# Patient Record
Sex: FEMALE | Race: WHITE | Employment: FULL TIME | URBAN - METROPOLITAN AREA
[De-identification: names, ages, dates, MRNs, and addresses within clinical notes are randomized per-mention and may not be internally consistent; named-entity substitution may affect disease eponyms.]

---

## 2017-03-10 ENCOUNTER — EMPLOYEE WELLNESS (OUTPATIENT)
Dept: OTHER | Age: 57
End: 2017-03-10

## 2017-04-10 ENCOUNTER — HOSPITAL ENCOUNTER (OUTPATIENT)
Dept: WOMENS IMAGING | Age: 57
Discharge: HOME | End: 2017-04-10

## 2017-04-10 DIAGNOSIS — Z12.31 VISIT FOR SCREENING MAMMOGRAM: ICD-10-CM

## 2017-08-07 ENCOUNTER — OFFICE VISIT (OUTPATIENT)
Dept: DERMATOLOGY | Age: 57
End: 2017-08-07

## 2017-08-07 DIAGNOSIS — Z80.8 FAMILY HISTORY OF MELANOMA: ICD-10-CM

## 2017-08-07 DIAGNOSIS — D22.9 MULTIPLE NEVI: Primary | ICD-10-CM

## 2017-08-07 DIAGNOSIS — L82.1 SEBORRHEIC KERATOSIS: ICD-10-CM

## 2017-08-07 DIAGNOSIS — L57.0 AK (ACTINIC KERATOSIS): ICD-10-CM

## 2017-08-07 PROCEDURE — 17000 DESTRUCT PREMALG LESION: CPT | Performed by: DERMATOLOGY

## 2017-08-07 PROCEDURE — 99213 OFFICE O/P EST LOW 20 MIN: CPT | Performed by: DERMATOLOGY

## 2017-08-07 PROCEDURE — 17003 DESTRUCT PREMALG LES 2-14: CPT | Performed by: DERMATOLOGY

## 2018-03-15 ENCOUNTER — EMPLOYEE WELLNESS (OUTPATIENT)
Dept: OTHER | Age: 58
End: 2018-03-15

## 2018-03-15 LAB
CHOLESTEROL, TOTAL: 207 MG/DL (ref 0–199)
GLUCOSE BLD-MCNC: 73 MG/DL (ref 70–99)
HDLC SERPL-MCNC: 76 MG/DL (ref 40–60)
LDL CHOLESTEROL CALCULATED: 108 MG/DL
TRIGL SERPL-MCNC: 116 MG/DL (ref 0–150)

## 2018-03-20 VITALS — WEIGHT: 177 LBS | BODY MASS INDEX: 28.57 KG/M2

## 2018-04-02 VITALS — BODY MASS INDEX: 29.21 KG/M2 | WEIGHT: 181 LBS

## 2018-04-11 ENCOUNTER — HOSPITAL ENCOUNTER (OUTPATIENT)
Dept: WOMENS IMAGING | Age: 58
Discharge: HOME | End: 2018-04-11
Attending: OBSTETRICS & GYNECOLOGY | Admitting: OBSTETRICS & GYNECOLOGY

## 2018-04-11 DIAGNOSIS — Z12.31 VISIT FOR SCREENING MAMMOGRAM: ICD-10-CM

## 2018-04-30 ENCOUNTER — NURSE TRIAGE (OUTPATIENT)
Dept: OTHER | Facility: CLINIC | Age: 58
End: 2018-04-30

## 2018-05-10 RX ORDER — EPINEPHRINE 0.3 MG/.3ML
INJECTION SUBCUTANEOUS
Qty: 2 EACH | Refills: 0 | Status: SHIPPED | OUTPATIENT
Start: 2018-05-10 | End: 2021-04-26 | Stop reason: SDUPTHER

## 2018-08-07 ENCOUNTER — OFFICE VISIT (OUTPATIENT)
Dept: FAMILY MEDICINE CLINIC | Age: 58
End: 2018-08-07

## 2018-08-07 VITALS
SYSTOLIC BLOOD PRESSURE: 130 MMHG | WEIGHT: 183 LBS | BODY MASS INDEX: 29.54 KG/M2 | DIASTOLIC BLOOD PRESSURE: 64 MMHG | HEART RATE: 65 BPM | TEMPERATURE: 97.8 F | OXYGEN SATURATION: 96 % | RESPIRATION RATE: 14 BRPM

## 2018-08-07 DIAGNOSIS — R63.5 WEIGHT GAIN: ICD-10-CM

## 2018-08-07 DIAGNOSIS — Z12.11 SCREEN FOR COLON CANCER: ICD-10-CM

## 2018-08-07 DIAGNOSIS — R00.2 HEART PALPITATIONS: Primary | ICD-10-CM

## 2018-08-07 DIAGNOSIS — R43.0 ANOSMIA: ICD-10-CM

## 2018-08-07 PROCEDURE — 99203 OFFICE O/P NEW LOW 30 MIN: CPT | Performed by: FAMILY MEDICINE

## 2018-08-07 PROCEDURE — 93000 ELECTROCARDIOGRAM COMPLETE: CPT | Performed by: FAMILY MEDICINE

## 2018-08-07 RX ORDER — MEDROXYPROGESTERONE ACETATE 2.5 MG/1
2.5 TABLET ORAL NIGHTLY
COMMUNITY
Start: 2017-12-19

## 2018-08-07 RX ORDER — ESTRADIOL 1 MG/1
1 TABLET ORAL NIGHTLY
COMMUNITY
Start: 2017-12-19

## 2018-08-07 ASSESSMENT — PATIENT HEALTH QUESTIONNAIRE - PHQ9
1. LITTLE INTEREST OR PLEASURE IN DOING THINGS: 0
SUM OF ALL RESPONSES TO PHQ QUESTIONS 1-9: 0
2. FEELING DOWN, DEPRESSED OR HOPELESS: 0
SUM OF ALL RESPONSES TO PHQ9 QUESTIONS 1 & 2: 0

## 2018-08-07 NOTE — PROGRESS NOTES
Subjective:      Patient ID: Hannah Jean is a 59 y.o. female. Blood pressure 130/64, pulse 65, temperature 97.8 °F (36.6 °C), temperature source Oral, resp. rate 14, weight 183 lb (83 kg), SpO2 96 %, not currently breastfeeding. HPI last seen over 3 yrs ago. Usually goes to gyn for well care. Has several concerns to discuss. Has noted loss of sense of smell over past few years. For instance, she was peeling eggs for deviled eggs and she did not note at all. Has hx of PCV's but has noted that her palpitations are more frequent, but still without pain or dyspnea. Palpitations are brief. Controls with deep breathing. Notes them more at night. Gradual weight gain. Prior gastric sleeve done 2010- does not still follow with DR Navarro Lang. Uses daily MVI.  has gained 30 lbs back from prior weight after surgery. Has not had colon cancer screening- would like to have referral.    She is taking estrace and provera for menopausal symptoms- urine incontinence, vaginal dryness.       Lab Results   Component Value Date    CHOL 207 (H) 03/15/2018    TRIG 116 03/15/2018    HDL 76 (H) 03/15/2018    LDLCALC 108 (H) 03/15/2018     Lab Results   Component Value Date    ALT 15 01/20/2015    AST 19 01/20/2015        The 10-year ASCVD risk score (Fabby Villa, et al., 2013) is: 2.8%    Values used to calculate the score:      Age: 59 years      Sex: Female      Is Non- : No      Diabetic: No      Tobacco smoker: No      Systolic Blood Pressure: 595 mmHg      Is BP treated: No      HDL Cholesterol: 76 mg/dL      Total Cholesterol: 207 mg/dL     Has normal renal function   Lab Results   Component Value Date     01/20/2015    K 3.7 01/20/2015    BUN 16 01/20/2015    CREATININE 0.5 01/20/2015          Patient Active Problem List   Diagnosis    Hyperlipidemia    Obesity    Vitamin D deficiency    Bee sting allergy    Heart palpitations- pvc's on ekg    Primary osteoarthritis of right knee    Pes anserinus bursitis      Body mass index is 29.54 kg/m². Wt Readings from Last 3 Encounters:   08/07/18 183 lb (83 kg)   03/15/18 181 lb (82.1 kg)   03/10/17 177 lb (80.3 kg)      BP Readings from Last 3 Encounters:   08/07/18 130/64   05/12/16 123/84   01/09/15 118/76      Current Outpatient Prescriptions   Medication Sig Dispense Refill    estradiol (ESTRACE) 1 MG tablet take 1 tablet (1 mg) by oral route once daily for 90 days      medroxyPROGESTERone (PROVERA) 2.5 MG tablet take 1 tablet by oral route daily for 90 days      EPINEPHrine (EPIPEN 2-GODWIN) 0.3 MG/0.3ML SOAJ injection INJECT 0.3 INTO THE SKIN ONCE FOR UP TO ONE DOSE AS DIRECTED FOR ALLERGIC REACTION 2 each 0    Multiple Vitamin (MULTIVITAMIN PO) Take  by mouth.  aspirin 81 MG EC tablet Take 81 mg by mouth daily. No current facility-administered medications for this visit. Immunization History   Administered Date(s) Administered    Hepatitis A 07/28/2015    MMR 07/28/2015    Pneumococcal Polysaccharide (Ozoontefo91) 07/28/2015    Tdap (Boostrix, Adacel) 07/28/2015    Typhoid Inactivated 07/25/2015        Social History   Substance Use Topics    Smoking status: Never Smoker    Smokeless tobacco: Never Used    Alcohol use Yes      Comment: Social        Review of Systems   All other systems reviewed and are negative. Objective:   Physical Exam   Constitutional: She is oriented to person, place, and time. She appears well-developed and well-nourished. No distress. HENT:   Head: Normocephalic and atraumatic. Right Ear: External ear normal.   Left Ear: External ear normal.   Nose: Nose normal.   Mouth/Throat: Oropharynx is clear and moist. No oropharyngeal exudate. TM's: nl  Canals: nl  Hearing: nl   Eyes: Conjunctivae and EOM are normal. Pupils are equal, round, and reactive to light. Right eye exhibits no discharge. Left eye exhibits no discharge. Neck: Normal range of motion. Neck supple.  No

## 2018-08-10 DIAGNOSIS — R00.2 HEART PALPITATIONS: ICD-10-CM

## 2018-08-10 LAB
A/G RATIO: 1.7 (ref 1.1–2.2)
ALBUMIN SERPL-MCNC: 4.6 G/DL (ref 3.4–5)
ALP BLD-CCNC: 117 U/L (ref 40–129)
ALT SERPL-CCNC: 15 U/L (ref 10–40)
ANION GAP SERPL CALCULATED.3IONS-SCNC: 15 MMOL/L (ref 3–16)
AST SERPL-CCNC: 20 U/L (ref 15–37)
BASOPHILS ABSOLUTE: 0 K/UL (ref 0–0.2)
BASOPHILS RELATIVE PERCENT: 0.6 %
BILIRUB SERPL-MCNC: 0.5 MG/DL (ref 0–1)
BUN BLDV-MCNC: 11 MG/DL (ref 7–20)
CALCIUM SERPL-MCNC: 9.4 MG/DL (ref 8.3–10.6)
CHLORIDE BLD-SCNC: 101 MMOL/L (ref 99–110)
CO2: 25 MMOL/L (ref 21–32)
CREAT SERPL-MCNC: 0.6 MG/DL (ref 0.6–1.2)
EOSINOPHILS ABSOLUTE: 0.1 K/UL (ref 0–0.6)
EOSINOPHILS RELATIVE PERCENT: 2.7 %
GFR AFRICAN AMERICAN: >60
GFR NON-AFRICAN AMERICAN: >60
GLOBULIN: 2.7 G/DL
GLUCOSE BLD-MCNC: 84 MG/DL (ref 70–99)
HCT VFR BLD CALC: 41.1 % (ref 36–48)
HEMOGLOBIN: 13.7 G/DL (ref 12–16)
LYMPHOCYTES ABSOLUTE: 1.4 K/UL (ref 1–5.1)
LYMPHOCYTES RELATIVE PERCENT: 26.2 %
MCH RBC QN AUTO: 31.8 PG (ref 26–34)
MCHC RBC AUTO-ENTMCNC: 33.3 G/DL (ref 31–36)
MCV RBC AUTO: 95.4 FL (ref 80–100)
MONOCYTES ABSOLUTE: 0.3 K/UL (ref 0–1.3)
MONOCYTES RELATIVE PERCENT: 5.6 %
NEUTROPHILS ABSOLUTE: 3.5 K/UL (ref 1.7–7.7)
NEUTROPHILS RELATIVE PERCENT: 64.9 %
PDW BLD-RTO: 13.1 % (ref 12.4–15.4)
PLATELET # BLD: 272 K/UL (ref 135–450)
PMV BLD AUTO: 8.6 FL (ref 5–10.5)
POTASSIUM SERPL-SCNC: 4.1 MMOL/L (ref 3.5–5.1)
RBC # BLD: 4.31 M/UL (ref 4–5.2)
SODIUM BLD-SCNC: 141 MMOL/L (ref 136–145)
TOTAL PROTEIN: 7.3 G/DL (ref 6.4–8.2)
TSH REFLEX: 2.45 UIU/ML (ref 0.27–4.2)
WBC # BLD: 5.4 K/UL (ref 4–11)

## 2018-08-13 ENCOUNTER — OFFICE VISIT (OUTPATIENT)
Dept: DERMATOLOGY | Age: 58
End: 2018-08-13

## 2018-08-13 DIAGNOSIS — D22.9 MULTIPLE NEVI: Primary | ICD-10-CM

## 2018-08-13 DIAGNOSIS — L57.0 AK (ACTINIC KERATOSIS): ICD-10-CM

## 2018-08-13 DIAGNOSIS — L82.1 SEBORRHEIC KERATOSIS: ICD-10-CM

## 2018-08-13 DIAGNOSIS — Z80.8 FAMILY HISTORY OF MALIGNANT MELANOMA: ICD-10-CM

## 2018-08-13 PROCEDURE — 17000 DESTRUCT PREMALG LESION: CPT | Performed by: DERMATOLOGY

## 2018-08-13 PROCEDURE — 99213 OFFICE O/P EST LOW 20 MIN: CPT | Performed by: DERMATOLOGY

## 2018-08-13 PROCEDURE — 17003 DESTRUCT PREMALG LES 2-14: CPT | Performed by: DERMATOLOGY

## 2018-08-13 NOTE — PROGRESS NOTES
THE SKIN ONCE FOR UP TO ONE DOSE AS DIRECTED FOR ALLERGIC REACTION 2 each 0    Multiple Vitamin (MULTIVITAMIN PO) Take  by mouth.  aspirin 81 MG EC tablet Take 81 mg by mouth daily. Allergies   Allergen Reactions    Nutritional Supplements Anaphylaxis    Penicillins Anaphylaxis    Cefuroxime Axetil          Physical Examination     Gen, well-appearing  The following were examined and determined to be normal: Psych/Neuro, Scalp/hair, Conjunctivae/eyelids, Neck, Abdomen, Back, RUE, LUE, RLE, LLE, Nails/digits and buttocks. The following were examined and determined to be abnormal: Breast/axilla/chest. Face/lip   trunk and extremities with scattered brown macules and papules   Chest (3) and R upper lip with erythematous roughly scaled macules    Assessment and Plan     1. Benign-appearing nevi, SK's and family hx of melanoma   - educ re ABCD's of MM   educ sun protection   encouraged skin check yearly (sooner if indicated), self checks    2. Few AK's on the chest (3), R upper lip (1)  - 4 lesion(s) treated with liquid nitrogen x 2 cycles. Patient educated on risk of blister, hypopigmentation/scar and wound care. F/u 1 year, sooner prn.

## 2018-08-29 ENCOUNTER — PAT TELEPHONE (OUTPATIENT)
Dept: PREADMISSION TESTING | Age: 58
End: 2018-08-29

## 2018-08-29 VITALS — WEIGHT: 180 LBS | BODY MASS INDEX: 28.93 KG/M2 | HEIGHT: 66 IN

## 2018-08-29 NOTE — PROGRESS NOTES
[address] time__0930__________        Surgery time___1030_________    Take the following medications with a sip of water:    Do not eat or drink anything after 12:00 midnight prior to your surgery. EXCEPT PREP  This includes water chewing gum, mints and ice chips. You may brush your teeth and gargle the morning of your surgery, but do not swallow the water     You may be asked to stop blood thinners such as Coumadin, Plavix, Fragmin, Lovenox, etc., or any anti-inflammatories such as:  Aspirin, Ibuprofen, Advil, Naproxen prior to your surgery. We also ask that you stop any OTC medications such as fish oil, vitamin E, glucosamine, garlic, Multivitamins, COQ 10, etc.    We ask that you do not smoke 24 hours prior to surgery  We ask that you do not  drink any alcoholic beverages 24 hours prior to surgery     You must make arrangements for a responsible adult to take you home after your surgery. For your safety you will not be allowed to leave alone or drive yourself home. Your surgery will be cancelled if you do not have a ride home. Also for your safety, it is strongly suggested that someone stay with you the first 24 hours after your surgery. A parent or legal guardian must accompany a child scheduled for surgery and plan to stay at the hospital until the child is discharged. Please do not bring other children with you. For your comfort, please wear simple loose fitting clothing to the hospital.  Please do not bring valuables. Do not wear any make-up or nail polish on your fingers or toes      For your safety, please do not wear any jewelry or body piercing's on the day of surgery. All jewelry must be removed. If you have dentures, they will be removed before going to operating room. For your convenience, we will provide you with a container.     If you wear contact lenses or glasses, they will be removed, please bring a case for

## 2018-09-06 ENCOUNTER — HOSPITAL ENCOUNTER (OUTPATIENT)
Dept: ENDOSCOPY | Age: 58
Discharge: HOME | End: 2018-09-06
Attending: INTERNAL MEDICINE | Admitting: INTERNAL MEDICINE

## 2018-09-06 VITALS
RESPIRATION RATE: 18 BRPM | HEIGHT: 66 IN | HEART RATE: 68 BPM | BODY MASS INDEX: 28.61 KG/M2 | TEMPERATURE: 97.2 F | WEIGHT: 178 LBS | OXYGEN SATURATION: 100 % | SYSTOLIC BLOOD PRESSURE: 130 MMHG | DIASTOLIC BLOOD PRESSURE: 84 MMHG

## 2018-09-06 DIAGNOSIS — Z12.11 ENCOUNTER FOR SCREENING FOR MALIGNANT NEOPLASM OF COLON: ICD-10-CM

## 2018-09-06 RX ORDER — SODIUM CHLORIDE 9 MG/ML
INJECTION, SOLUTION INTRAVENOUS CONTINUOUS
Status: DISCONTINUED | OUTPATIENT
Start: 2018-09-06 | End: 2018-09-07 | Stop reason: HOSPADM

## 2018-09-06 RX ADMIN — SODIUM CHLORIDE: 9 INJECTION, SOLUTION INTRAVENOUS at 10:17

## 2018-09-06 ASSESSMENT — PAIN SCALES - GENERAL
PAINLEVEL_OUTOF10: 0
PAINLEVEL_OUTOF10: 0

## 2018-09-06 ASSESSMENT — PAIN SCALES - WONG BAKER: WONGBAKER_NUMERICALRESPONSE: 0

## 2018-09-06 ASSESSMENT — PAIN - FUNCTIONAL ASSESSMENT: PAIN_FUNCTIONAL_ASSESSMENT: 0-10

## 2018-09-06 NOTE — ANESTHESIA POST-OP
Anesthesia Post-op Note    Patient: Blanca Keyes  MRN: [id number]  YOB: 1960  Date of evaluation: 9/6/2018  Time:  11:30 AM     Procedure(s) Performed:     Last Vitals: /83   Pulse 69   Temp 97.2 °F (36.2 °C) (Tympanic)   Resp 20   Ht 5' 6\" (1.676 m)   Wt 178 lb (80.7 kg)   LMP 11/15/2010   SpO2 97%   BMI 28.73 kg/m²     Eladio Phase I: Eladio Score: 10    Eladio Phase II: Eladio Score: 10    Anesthesia Post Evaluation    Final anesthesia type: MAC  Patient location during evaluation: PACU  Patient participation: complete - patient participated  Level of consciousness: awake and alert  Pain score: 0  Airway patency: patent  Nausea & Vomiting: no nausea and no vomiting  Complications: no  Cardiovascular status: blood pressure returned to baseline  Respiratory status: acceptable  Hydration status: euvolemic        Iban Queen MD  11:30 AM

## 2018-09-06 NOTE — ANESTHESIA PRE-OP
Department of Anesthesiology  Preprocedure Note       Name:  Deondre Parr   Age:  59 y.o.  :  1960                                          MRN:  [id number]         Date:  2018      Surgeon:    Procedure:    Medications prior to admission:   Prior to Admission medications    Medication Sig Start Date End Date Taking? Authorizing Provider   estradiol (ESTRACE) 1 MG tablet take 1 tablet (1 mg) by oral route once daily for 90 days 17   Historical Provider, MD   medroxyPROGESTERone (PROVERA) 2.5 MG tablet take 1 tablet by oral route daily for 90 days 17   Historical Provider, MD   EPINEPHrine (EPIPEN 2-GODWIN) 0.3 MG/0.3ML SOAJ injection INJECT 0.3 INTO THE SKIN ONCE FOR UP TO ONE DOSE AS DIRECTED FOR ALLERGIC REACTION 5/10/18   Mora Marley MD   Multiple Vitamin (MULTIVITAMIN PO) Take  by mouth. Historical Provider, MD   aspirin 81 MG EC tablet Take 81 mg by mouth daily. Historical Provider, MD       Current medications:    Current Outpatient Prescriptions   Medication Sig Dispense Refill    estradiol (ESTRACE) 1 MG tablet take 1 tablet (1 mg) by oral route once daily for 90 days      medroxyPROGESTERone (PROVERA) 2.5 MG tablet take 1 tablet by oral route daily for 90 days      EPINEPHrine (EPIPEN 2-GODWIN) 0.3 MG/0.3ML SOAJ injection INJECT 0.3 INTO THE SKIN ONCE FOR UP TO ONE DOSE AS DIRECTED FOR ALLERGIC REACTION 2 each 0    Multiple Vitamin (MULTIVITAMIN PO) Take  by mouth.  aspirin 81 MG EC tablet Take 81 mg by mouth daily. No current facility-administered medications for this encounter. Allergies:     Allergies   Allergen Reactions    Penicillins Anaphylaxis    Cefuroxime Axetil        Problem List:    Patient Active Problem List   Diagnosis Code    Obesity E66.9    Vitamin D deficiency E55.9    Bee sting allergy Z91.038    Heart palpitations- pvc's on ekg R00.2    Primary osteoarthritis of right knee M17.11       Past Medical History:        Diagnosis
Date    Bee sting allergy     Hyperlipidemia     Obesity        Past Surgical History:        Procedure Laterality Date    BLEPHAROPLASTY Bilateral 2014    Dr Chantel Clemente Left 2008    hammer toe repair; DR Alfredo Bosch LASBRENDA Bilateral     Dr Santiago Done GASTROPLASTY  12/7/10    Laparoscopic sleeve gastrectomy (Dr Jermaine Martin)   [address] [facility]    WRIST SURGERY Left 1992       Social History:    Social History   Substance Use Topics    Smoking status: Never Smoker    Smokeless tobacco: Never Used    Alcohol use Yes      Comment: on weekends, 2-3 drinks                                Counseling given: Not Answered      Vital Signs (Current): There were no vitals filed for this visit. BP Readings from Last 3 Encounters:   08/07/18 130/64   05/12/16 123/84   01/09/15 118/76       NPO Status:  midnight                                                                               BMI:   Wt Readings from Last 3 Encounters:   08/29/18 180 lb (81.6 kg)   08/07/18 183 lb (83 kg)   03/15/18 181 lb (82.1 kg)     There is no height or weight on file to calculate BMI. Anesthesia Evaluation   no history of anesthetic complications:   Airway: Mallampati: II  TM distance: >3 FB   Neck ROM: full  Mouth opening: > = 3 FB Dental:      Comment: Permanent bridge upper left      Pulmonary:                             ROS comment: Denies Asthma, COPD, Smoking   Cardiovascular:        (-) hypertension, past MI, CAD and  angina             ROS comment: Deneis CP, SOA with moderate exercise     Neuro/Psych:   Negative Neuro/Psych ROS              GI/Hepatic/Renal:   (+) GERD (occasional):,      (-) liver disease and no renal disease       Endo/Other:        (-) diabetes mellitus, hypothyroidism               Abdominal:           Vascular: negative vascular ROS.                                      Anesthesia Plan      MAC     ASA 2       Induction:

## 2018-09-06 NOTE — OP NOTE
Colonoscopy Procedure Note      Patient: Swapna Pierson  : 1960  Acct#:     Procedure: Colonoscopy with biopsy    Date:  2018    Surgeon:  Cosmo Dumont MD    Referring Physician:  Henrique Garcia MD    Previous Colonoscopy: NO  Date: N/A  Greater than 3 years: N/A    Preoperative Diagnosis:  1. Screening/No FH of CRC     Postoperative Diagnosis:  1. Ascending Colon Polyp 2. Mild Sigmoid Diverticulosis 3. Internal hemorrhoids    Consent:  The patient or their legal guardian has signed a consent, and is aware of the potential risks, benefits, alternatives, and potential complications of this procedure. These include, but are not limited to hemorrhage, bleeding, post procedural pain, perforation, phlebitis, aspiration, hypotension, hypoxia, cardiovascular events such as arryhthmia, and possibly death. Additionally, the possibility of missed colonic polyps and interval colon cancer was discussed in the consent. Anesthesia:  The patient was administered IV propofol per anesthesiology team.  Please see their operative records for full details. Procedure: An informed consent was obtained from the patient after explanation of indications, benefits, possible risks and complications of the procedure. The patient was then taken to the endoscopy suite, placed in the left lateral decubitus position, and the above IV anesthesia was administered. A digital rectal examination was performed and revealed negative without mass, lesions or tenderness. The Olympus video colonoscope was placed in the patient's rectum under digital direction and advanced to the cecum. The cecum was identified by characteristic anatomy and ballottment. The preparation was excellent. The ileocecal valve was identified. The terminal ileum was normal appearing. The scope was then withdrawn back through the cecum, ascending, transverse, descending, sigmoid colon, and rectum.   Careful circumferential

## 2018-09-07 PROBLEM — Z86.010 H/O COLONOSCOPY WITH POLYPECTOMY: Status: ACTIVE | Noted: 2018-09-07

## 2018-09-07 PROBLEM — Z98.890 H/O COLONOSCOPY WITH POLYPECTOMY: Status: ACTIVE | Noted: 2018-09-07

## 2018-09-07 PROBLEM — Z86.0100 H/O COLONOSCOPY WITH POLYPECTOMY: Status: ACTIVE | Noted: 2018-09-07

## 2019-05-07 ENCOUNTER — EMPLOYEE WELLNESS (OUTPATIENT)
Dept: OTHER | Age: 59
End: 2019-05-07

## 2019-05-07 LAB
CHOLESTEROL, TOTAL: 203 MG/DL (ref 0–199)
GLUCOSE BLD-MCNC: 80 MG/DL (ref 70–99)
HDLC SERPL-MCNC: 77 MG/DL (ref 40–60)
LDL CHOLESTEROL CALCULATED: 107 MG/DL
TRIGL SERPL-MCNC: 93 MG/DL (ref 0–150)

## 2019-05-08 ENCOUNTER — HOSPITAL ENCOUNTER (OUTPATIENT)
Dept: WOMENS IMAGING | Age: 59
Discharge: HOME | End: 2019-05-08
Payer: COMMERCIAL

## 2019-05-08 DIAGNOSIS — Z12.31 VISIT FOR SCREENING MAMMOGRAM: ICD-10-CM

## 2019-05-08 PROCEDURE — 77063 BREAST TOMOSYNTHESIS BI: CPT

## 2019-05-13 VITALS — BODY MASS INDEX: 27.6 KG/M2 | WEIGHT: 171 LBS

## 2019-08-12 ENCOUNTER — OFFICE VISIT (OUTPATIENT)
Dept: DERMATOLOGY | Age: 59
End: 2019-08-12
Payer: COMMERCIAL

## 2019-08-12 DIAGNOSIS — Z80.8 FAMILY HISTORY OF MALIGNANT MELANOMA: ICD-10-CM

## 2019-08-12 DIAGNOSIS — L82.1 SEBORRHEIC KERATOSIS: ICD-10-CM

## 2019-08-12 DIAGNOSIS — L57.0 AK (ACTINIC KERATOSIS): ICD-10-CM

## 2019-08-12 DIAGNOSIS — D22.9 MULTIPLE NEVI: Primary | ICD-10-CM

## 2019-08-12 DIAGNOSIS — B07.8 OTHER VIRAL WARTS: ICD-10-CM

## 2019-08-12 PROCEDURE — 17110 DESTRUCTION B9 LES UP TO 14: CPT | Performed by: DERMATOLOGY

## 2019-08-12 PROCEDURE — 17003 DESTRUCT PREMALG LES 2-14: CPT | Performed by: DERMATOLOGY

## 2019-08-12 PROCEDURE — 99213 OFFICE O/P EST LOW 20 MIN: CPT | Performed by: DERMATOLOGY

## 2019-08-12 PROCEDURE — 17000 DESTRUCT PREMALG LESION: CPT | Performed by: DERMATOLOGY

## 2019-08-12 NOTE — PROGRESS NOTES
tablet by oral route daily for 90 days      EPINEPHrine (EPIPEN 2-GODWIN) 0.3 MG/0.3ML SOAJ injection INJECT 0.3 INTO THE SKIN ONCE FOR UP TO ONE DOSE AS DIRECTED FOR ALLERGIC REACTION 2 each 0    Multiple Vitamin (MULTIVITAMIN PO) Take  by mouth.  aspirin 81 MG EC tablet Take 81 mg by mouth daily. Allergies   Allergen Reactions    Penicillins Anaphylaxis    Cefuroxime Axetil          Physical Examination     Gen, well-appearing  The following were examined and determined to be normal: Psych/Neuro, Scalp/hair, Conjunctivae/eyelids, Neck, Abdomen, Back, LUE, RLE, LLE, Nails/digits and buttocks. Face/lip    The following were examined and determined to be abnormal: Breast/axilla/chest. RUE  trunk and extremities with scattered brown macules and papules   Chest (4) and R arm and R shoulder with erythematous roughly scaled macules  R cental palm with hyperkeratotic 1-2 mm papule    Assessment and Plan     1. Benign-appearing nevi, SK's and family hx of melanoma   - educ re ABCD's of MM   educ sun protection   encouraged skin check yearly (sooner if indicated), self checks    2. Few AK's on the chest (4), R arm and shuolder (2)  - 6 lesion(s) treated with liquid nitrogen x 2 cycles. Patient educated on risk of blister, hypopigmentation/scar and wound care. 3. R palm - Wart - less likely punctate keratosis   - 1 tender lesion(s)  treated with liquid nitrogen x 2 cycles. Patient educated on risk of blister, hypopigmentation/scar and wound care. F/u 1 year, sooner prn.

## 2020-03-02 ENCOUNTER — EMPLOYEE WELLNESS (OUTPATIENT)
Dept: OTHER | Age: 60
End: 2020-03-02

## 2020-03-02 LAB
CHOLESTEROL, TOTAL: 206 MG/DL (ref 0–199)
GLUCOSE BLD-MCNC: 74 MG/DL (ref 70–99)
HDLC SERPL-MCNC: 73 MG/DL (ref 40–60)
LDL CHOLESTEROL CALCULATED: 111 MG/DL
TRIGL SERPL-MCNC: 112 MG/DL (ref 0–150)

## 2020-03-05 LAB
3-OH-COTININE: <2 NG/ML
COTININE: <2 NG/ML
NICOTINE: <2 NG/ML

## 2020-04-21 ENCOUNTER — OFFICE VISIT (OUTPATIENT)
Dept: PRIMARY CARE CLINIC | Age: 60
End: 2020-04-21

## 2020-04-23 LAB
SARS-COV-2: NOT DETECTED
SOURCE: NORMAL

## 2020-06-05 ENCOUNTER — HOSPITAL ENCOUNTER (OUTPATIENT)
Dept: WOMENS IMAGING | Age: 60
Discharge: HOME | End: 2020-06-05
Payer: COMMERCIAL

## 2020-06-05 PROCEDURE — 77063 BREAST TOMOSYNTHESIS BI: CPT

## 2020-08-10 ENCOUNTER — OFFICE VISIT (OUTPATIENT)
Dept: DERMATOLOGY | Age: 60
End: 2020-08-10
Payer: COMMERCIAL

## 2020-08-10 VITALS — TEMPERATURE: 97.7 F

## 2020-08-10 PROCEDURE — 99213 OFFICE O/P EST LOW 20 MIN: CPT | Performed by: DERMATOLOGY

## 2020-08-10 PROCEDURE — 17000 DESTRUCT PREMALG LESION: CPT | Performed by: DERMATOLOGY

## 2020-08-10 PROCEDURE — 17003 DESTRUCT PREMALG LES 2-14: CPT | Performed by: DERMATOLOGY

## 2020-08-10 RX ORDER — ESTRADIOL 0.5 MG/1
0.5 TABLET ORAL NIGHTLY
COMMUNITY
Start: 2020-05-15

## 2020-08-10 RX ORDER — MEDROXYPROGESTERONE ACETATE 2.5 MG/1
2.5 TABLET ORAL NIGHTLY
COMMUNITY
Start: 2020-02-14

## 2020-08-10 NOTE — PROGRESS NOTES
[facility]  Kuldip Hunt MD  [facility]  1960    56 y.o. female     Date of Visit: 8/10/2020    Chief Complaint: moles, f/u AK's  Chief Complaint   Patient presents with    Skin Exam     Last seen: 8-2019    History of Present Illness:    1. Here for evaluation of multiple asx pigmented lesions on the trunk and extremities, present for many years; no change in size/shape/color of any lesions; no bleeding lesions. 2. She has a few rough asx lesions on the FH. Asx. Hx of AK's - no probs with previous sites. She typically goes on a diving trip at least yearly - [employer], [place]. She wears sunscreen but gets a lot of sun exposure. No personal hx of skin cancer. Father with hx of 2 melanomas. No other known family hx of skin cancer. She works at [employer].    Nancy Guo 14 well, good sense of health. Skin: No changing moles or lesions. Past Medical History, Family History, Surgical History, Medications and Allergies reviewed.     Past Medical History:   Diagnosis Date    Bee sting allergy     Hyperlipidemia     Obesity        Past Surgical History:   Procedure Laterality Date    BLEPHAROPLASTY Bilateral 2014    Dr Preston Northern  09/06/2018    dr Nilo Nova Left 2008    hammer toe repair; DR Vernal Gitelman LASIK Bilateral     Dr Rosalino Castillo GASTROPLASTY  12/7/10    Laparoscopic sleeve gastrectomy (Dr Sonal Lindsay)   [address] [facility] Left 1992       Outpatient Medications Marked as Taking for the 8/10/20 encounter (Office Visit) with Vignesh Bull MD   Medication Sig Dispense Refill    estradiol (ESTRACE) 0.5 MG tablet       medroxyPROGESTERone (PROVERA) 2.5 MG tablet TAKE ONE TABLET BY MOUTH DAILY FOR 90 DAYS      medroxyPROGESTERone (PROVERA) 2.5 MG tablet take 1 tablet by oral route daily for 90 days         Allergies   Allergen Reactions    Penicillins Anaphylaxis    Cefuroxime Axetil          Physical Examination     Gen, well-appearing  The following were examined and determined to be normal: Psych/Neuro, Scalp/hair, Conjunctivae/eyelids, Neck, Abdomen, Back, LUE, RLE, LLE, Nails/digits and buttocks. Face/lip    The following were examined and determined to be abnormal: Breast/axilla/chest. RUE  trunk and extremities with scattered brown macules and papules   FH with 2 erythematous roughly scaled macules    Assessment and Plan     1. Benign-appearing nevi, SK's and family hx of melanoma   - educ re ABCD's of MM   educ sun protection   encouraged skin check yearly (sooner if indicated), self checks    2. Few AK's on the FH - 2  - 2 lesion(s) treated with liquid nitrogen x 2 cycles. Patient educated on risk of blister, hypopigmentation/scar and wound care. F/u 1 year, sooner prn.

## 2020-08-13 ENCOUNTER — PATIENT MESSAGE (OUTPATIENT)
Dept: FAMILY MEDICINE CLINIC | Age: 60
End: 2020-08-13

## 2020-08-14 NOTE — TELEPHONE ENCOUNTER
From: Dawood Chakraborty  To: Julee Tolbert MD  Sent: 8/13/2020 2:22 PM EDT  Subject: Non-Urgent Medical Question    I had the hepatitis c vaccine when I was working at [employer]. This was a series of three shots. Why is this in my records as not being done?

## 2020-10-19 VITALS — WEIGHT: 177 LBS | BODY MASS INDEX: 28.57 KG/M2

## 2021-04-26 ENCOUNTER — OFFICE VISIT (OUTPATIENT)
Dept: FAMILY MEDICINE CLINIC | Age: 61
End: 2021-04-26
Payer: COMMERCIAL

## 2021-04-26 VITALS
SYSTOLIC BLOOD PRESSURE: 110 MMHG | WEIGHT: 178 LBS | OXYGEN SATURATION: 96 % | HEART RATE: 93 BPM | HEIGHT: 66 IN | TEMPERATURE: 97.7 F | BODY MASS INDEX: 28.61 KG/M2 | DIASTOLIC BLOOD PRESSURE: 80 MMHG

## 2021-04-26 DIAGNOSIS — K21.9 GASTROESOPHAGEAL REFLUX DISEASE WITHOUT ESOPHAGITIS: Primary | ICD-10-CM

## 2021-04-26 DIAGNOSIS — R07.89 CHEST PRESSURE: ICD-10-CM

## 2021-04-26 PROCEDURE — 99214 OFFICE O/P EST MOD 30 MIN: CPT | Performed by: FAMILY MEDICINE

## 2021-04-26 PROCEDURE — 93000 ELECTROCARDIOGRAM COMPLETE: CPT | Performed by: FAMILY MEDICINE

## 2021-04-26 RX ORDER — EPINEPHRINE 0.3 MG/.3ML
INJECTION SUBCUTANEOUS
Qty: 2 EACH | Refills: 0 | Status: SHIPPED | OUTPATIENT
Start: 2021-04-26

## 2021-04-26 RX ORDER — OMEPRAZOLE 20 MG/1
20 CAPSULE, DELAYED RELEASE ORAL DAILY
Qty: 30 CAPSULE | Refills: 3 | Status: SHIPPED | OUTPATIENT
Start: 2021-04-26 | End: 2021-11-22

## 2021-04-26 ASSESSMENT — ENCOUNTER SYMPTOMS
DIARRHEA: 0
SHORTNESS OF BREATH: 0
VOMITING: 0
CONSTIPATION: 0
CHEST TIGHTNESS: 0
NAUSEA: 0

## 2021-04-26 ASSESSMENT — PATIENT HEALTH QUESTIONNAIRE - PHQ9: SUM OF ALL RESPONSES TO PHQ QUESTIONS 1-9: 0

## 2021-04-26 NOTE — PROGRESS NOTES
2021    Blood pressure 110/80, pulse 93, temperature 97.7 °F (36.5 °C), temperature source Temporal, height 5' 6\" (1.676 m), weight 178 lb (80.7 kg), last menstrual period 11/15/2010, SpO2 96 %, not currently breastfeeding. Norah Ni (:  1960) is a 56 y.o. female, here for evaluation of the following medical concerns:    Chief Complaint   Patient presents with    Other     on going heartburn - has gotten worse in past 5-6 weeks     Heartburn progressively getting worse x1 month   Drinks wine and coffee, chocolate, and lying down on the couch following eating   Some wt gain since last visit 7 lbs in 2019  Intermittent chest pressure x1 mon   Reports \"it may be related to anxiety but will get it at rest\"  Unsure if the heartburn/pressure correlate with one another but feel different  Doesn't wake her in the middle of the night    Takes tums which helps but found herself taking more than usual   Symptoms are more so a nuisance than anything   Brief heart palpitations/fluttering at night, occurs more when she is \"thinking about life\"  Denies SOB with exertion, chest pain  Takes baby aspirin    Very active, aure    Diet - no fast foods, home cooked meals  Colonoscopy completed in 2018 - due next in 10 years   Mammogram     Patient Active Problem List   Diagnosis    Obesity    Vitamin D deficiency    Bee sting allergy    Heart palpitations- pvc's on ekg    Primary osteoarthritis of right knee    H/O colonoscopy with polypectomy 18, Dr Kary Ulrich, 1 polyp (2mm)        Body mass index is 28.73 kg/m². Wt Readings from Last 3 Encounters:   21 178 lb (80.7 kg)   20 177 lb (80.3 kg)   19 171 lb (77.6 kg)       BP Readings from Last 3 Encounters:   21 110/80   18 130/84   18 130/64       Allergies   Allergen Reactions    Penicillins Anaphylaxis    Cefuroxime Axetil        Prior to Visit Medications    Medication Sig Taking?  Authorizing Provider medroxyPROGESTERone (PROVERA) 2.5 MG tablet TAKE ONE TABLET BY MOUTH DAILY FOR 90 DAYS Yes Historical Provider, MD   estradiol (ESTRACE) 1 MG tablet take 1 tablet (1 mg) by oral route once daily for 90 days Yes Historical Provider, MD   medroxyPROGESTERone (PROVERA) 2.5 MG tablet take 1 tablet by oral route daily for 90 days Yes Historical Provider, MD   EPINEPHrine (EPIPEN 2-GODWIN) 0.3 MG/0.3ML SOAJ injection INJECT 0.3 INTO THE SKIN ONCE FOR UP TO ONE DOSE AS DIRECTED FOR ALLERGIC REACTION Yes Aureliano Sinclair MD   Multiple Vitamin (MULTIVITAMIN PO) Take  by mouth. Yes Historical Provider, MD   aspirin 81 MG EC tablet Take 81 mg by mouth daily. Yes Historical Provider, MD   estradiol (ESTRACE) 0.5 MG tablet   Historical Provider, MD        Social History     Tobacco Use    Smoking status: Never Smoker    Smokeless tobacco: Never Used   Substance Use Topics    Alcohol use: Yes     Comment: on weekends, 2-3 drinks    Drug use: No       Review of Systems   Constitutional: Negative for activity change and fatigue. Respiratory: Negative for chest tightness and shortness of breath. Cardiovascular: Positive for palpitations. Negative for chest pain and leg swelling.        + upper epigastric pressure    Gastrointestinal: Negative for constipation, diarrhea, nausea and vomiting.        + Heartburn    Neurological: Negative for dizziness, light-headedness, numbness and headaches. Psychiatric/Behavioral: Negative for sleep disturbance. Physical Exam  Constitutional:       Appearance: Normal appearance. HENT:      Head: Normocephalic and atraumatic. Cardiovascular:      Rate and Rhythm: Normal rate and regular rhythm. Pulses: Normal pulses. Heart sounds: Normal heart sounds. Pulmonary:      Effort: Pulmonary effort is normal.      Breath sounds: Normal breath sounds. Neurological:      Mental Status: She is alert and oriented to person, place, and time.    Psychiatric:         Mood and Affect: Mood normal.         Behavior: Behavior normal.         Thought Content: Thought content normal.         Judgment: Judgment normal.         ASSESSMENT/PLAN:    1. Gastroesophageal reflux disease without esophagitis  Discussed common triggers and lifestyle modifications to prevent GERD symptoms   Advised patient to take Prilosec 20 mg daily 30 min before breakfast.    2. Chest pressure  Most likely r/t anxiety or reflux. Low CV risk. EKG unremarkable. Pt was reassured, hopefully will resolve with treatment of GERD. - EKG 12 Lead: NSR without conduction abnormalities. Rate 67. Follow up: 6 mon for physical    An  electronicsignature was used to authenticate this note.     --Harjit Root MD on 4/28/2021  at 4:07 PM

## 2021-04-27 ENCOUNTER — TELEPHONE (OUTPATIENT)
Dept: FAMILY MEDICINE CLINIC | Age: 61
End: 2021-04-27

## 2021-06-07 ENCOUNTER — HOSPITAL ENCOUNTER (OUTPATIENT)
Dept: WOMENS IMAGING | Age: 61
Discharge: HOME | End: 2021-06-07
Payer: COMMERCIAL

## 2021-06-07 DIAGNOSIS — Z12.31 BREAST CANCER SCREENING BY MAMMOGRAM: ICD-10-CM

## 2021-06-07 PROCEDURE — 77063 BREAST TOMOSYNTHESIS BI: CPT

## 2021-06-29 LAB
CHOLESTEROL, TOTAL: 178 MG/DL (ref 0–199)
GLUCOSE BLD-MCNC: 76 MG/DL (ref 70–99)
HDLC SERPL-MCNC: 57 MG/DL (ref 40–60)
LDL CHOLESTEROL CALCULATED: 99 MG/DL
TRIGL SERPL-MCNC: 109 MG/DL (ref 0–150)

## 2021-09-09 ENCOUNTER — ANESTHESIA EVENT (OUTPATIENT)
Dept: OPERATING ROOM | Age: 61
End: 2021-09-09
Payer: COMMERCIAL

## 2021-09-09 NOTE — PROGRESS NOTES
[address] [facility] time____0930________        Surgery time___1100_________    Take the following medications with a sip of water: Follow your MD/Surgeons pre-procedure instructions regarding your medications    Do not eat or drink anything after 12:00 midnight prior to your surgery. This includes water chewing gum, mints and ice chips. You may brush your teeth and gargle the morning of your surgery, but do not swallow the water     Please see your family doctor/pediatrician for a history and physical and/or concerning medications. Bring any test results/reports from your physicians office. If you are under the care of a heart doctor or specialist doctor, please be aware that you may be asked to them for clearance    You may be asked to stop blood thinners such as Coumadin, Plavix, Fragmin, Lovenox, etc., or any anti-inflammatories such as:  Aspirin, Ibuprofen, Advil, Naproxen prior to your surgery. We also ask that you stop any OTC medications such as fish oil, vitamin E, glucosamine, garlic, Multivitamins, COQ 10, etc. May take tylenol    We ask that you do not smoke 24 hours prior to surgery  We ask that you do not  drink any alcoholic beverages 24 hours prior to surgery     You must make arrangements for a responsible adult to take you home after your surgery. For your safety you will not be allowed to leave alone or drive yourself home. Your surgery will be cancelled if you do not have a ride home. Also for your safety, it is strongly suggested that someone stay with you the first 24 hours after your surgery. A parent or legal guardian must accompany a child scheduled for surgery and plan to stay at the hospital until the child is discharged. Please do not bring other children with you. For your comfort, please wear simple loose fitting clothing to the hospital.  Please do not bring valuables.     Do not wear any make-up or nail polish on your fingers or toes      For your safety, please do not wear any jewelry or body piercing's on the day of surgery. All jewelry must be removed. If you have dentures, they will be removed before going to operating room. For your convenience, we will provide you with a container. If you wear contact lenses or glasses, they will be removed, please bring a case for them. If you have a living will and a durable power of  for healthcare, please bring in a copy. As part of our patient safety program to minimize surgical site infections, we ask you to do the following:    · Please notify your surgeon if you develop any illness between         now and the  day of your surgery. · This includes a cough, cold, fever, sore throat, nausea,         or vomiting, and diarrhea, etc.  ·  Please notify your surgeon if you experience dizziness, shortness         of breath or blurred vision between now and the time of your surgery. Do not shave your operative site 96 hours prior to surgery. For face and neck surgery, men may use an electric razor 48 hours   prior to surgery. You may shower the night before surgery or the morning of   your surgery with an antibacterial soap. You will need to bring a photo ID and insurance card    [facility] has an onsite pharmacy, would you like to utilize our pharmacy     If you will be staying overnight and use a C-pap machine, please bring   your C-pap to hospital     Our goal is to provide you with excellent care, therefore, visitors will be limited to two(2) in the room at a time so that we may focus on providing this care for you. Please contact pre-admission testing if you have any further questions. [facility] phone number:  [phone] PAT fax number:  [phone]  Please note these are generalized instructions for all surgical cases, you may be provided with more specific instructions according to your surgery.

## 2021-09-09 NOTE — PROGRESS NOTES
Dr. Jeannette Marley office called for clarification of orders and to fax h&p to [facility] office for surgery on 9/10/2021

## 2021-09-09 NOTE — PROGRESS NOTES
DR. Oneida Toth OFFICE CALLED AGAIN FOR CLARIFICATION OF PRE-OP ORDERS-BLANK ORDER SHEET SENT OVER WITH NONE OF THE BOXES CHECKED-REQUESTED COMPLETED PRE-OP PROPHYLAXIS ORDER SHEET TO BE REFAXED

## 2021-09-10 ENCOUNTER — HOSPITAL ENCOUNTER (OUTPATIENT)
Age: 61
Setting detail: OUTPATIENT SURGERY
Discharge: HOME | End: 2021-09-10
Attending: OBSTETRICS & GYNECOLOGY | Admitting: OBSTETRICS & GYNECOLOGY
Payer: COMMERCIAL

## 2021-09-10 ENCOUNTER — ANESTHESIA (OUTPATIENT)
Dept: OPERATING ROOM | Age: 61
End: 2021-09-10
Payer: COMMERCIAL

## 2021-09-10 VITALS
OXYGEN SATURATION: 97 % | RESPIRATION RATE: 16 BRPM | SYSTOLIC BLOOD PRESSURE: 101 MMHG | DIASTOLIC BLOOD PRESSURE: 73 MMHG | TEMPERATURE: 97 F | BODY MASS INDEX: 27.64 KG/M2 | HEIGHT: 66 IN | WEIGHT: 171.96 LBS | HEART RATE: 66 BPM

## 2021-09-10 VITALS
RESPIRATION RATE: 5 BRPM | OXYGEN SATURATION: 99 % | DIASTOLIC BLOOD PRESSURE: 58 MMHG | SYSTOLIC BLOOD PRESSURE: 105 MMHG

## 2021-09-10 PROBLEM — N39.3 SUI (STRESS URINARY INCONTINENCE, FEMALE): Status: ACTIVE | Noted: 2021-09-10

## 2021-09-10 PROCEDURE — 7100000010 HC PHASE II RECOVERY - FIRST 15 MIN: Performed by: OBSTETRICS & GYNECOLOGY

## 2021-09-10 PROCEDURE — C1771 REP DEV, URINARY, W/SLING: HCPCS | Performed by: OBSTETRICS & GYNECOLOGY

## 2021-09-10 PROCEDURE — 3600000004 HC SURGERY LEVEL 4 BASE: Performed by: OBSTETRICS & GYNECOLOGY

## 2021-09-10 PROCEDURE — 7100000000 HC PACU RECOVERY - FIRST 15 MIN: Performed by: OBSTETRICS & GYNECOLOGY

## 2021-09-10 PROCEDURE — 7100000001 HC PACU RECOVERY - ADDTL 15 MIN: Performed by: OBSTETRICS & GYNECOLOGY

## 2021-09-10 PROCEDURE — 3700000001 HC ADD 15 MINUTES (ANESTHESIA): Performed by: OBSTETRICS & GYNECOLOGY

## 2021-09-10 PROCEDURE — 2500000003 HC RX 250 WO HCPCS: Performed by: NURSE ANESTHETIST, CERTIFIED REGISTERED

## 2021-09-10 PROCEDURE — 2580000003 HC RX 258: Performed by: NURSE ANESTHETIST, CERTIFIED REGISTERED

## 2021-09-10 PROCEDURE — 3600000014 HC SURGERY LEVEL 4 ADDTL 15MIN: Performed by: OBSTETRICS & GYNECOLOGY

## 2021-09-10 PROCEDURE — 6360000002 HC RX W HCPCS: Performed by: NURSE ANESTHETIST, CERTIFIED REGISTERED

## 2021-09-10 PROCEDURE — 2580000003 HC RX 258: Performed by: OBSTETRICS & GYNECOLOGY

## 2021-09-10 PROCEDURE — 7100000011 HC PHASE II RECOVERY - ADDTL 15 MIN: Performed by: OBSTETRICS & GYNECOLOGY

## 2021-09-10 PROCEDURE — 2709999900 HC NON-CHARGEABLE SUPPLY: Performed by: OBSTETRICS & GYNECOLOGY

## 2021-09-10 PROCEDURE — 3700000000 HC ANESTHESIA ATTENDED CARE: Performed by: OBSTETRICS & GYNECOLOGY

## 2021-09-10 PROCEDURE — 2500000003 HC RX 250 WO HCPCS: Performed by: OBSTETRICS & GYNECOLOGY

## 2021-09-10 DEVICE — TRANSOBTURATOR SLING SYSTEM WITH PRECISIONBLUE™ DESIGN
Type: IMPLANTABLE DEVICE | Site: PELVIS | Status: FUNCTIONAL
Brand: OBTRYX™ II SYSTEM - HALO

## 2021-09-10 RX ORDER — MAGNESIUM HYDROXIDE 1200 MG/15ML
LIQUID ORAL CONTINUOUS PRN
Status: COMPLETED | OUTPATIENT
Start: 2021-09-10 | End: 2021-09-10

## 2021-09-10 RX ORDER — LIDOCAINE HYDROCHLORIDE 20 MG/ML
INJECTION, SOLUTION INFILTRATION; PERINEURAL PRN
Status: DISCONTINUED | OUTPATIENT
Start: 2021-09-10 | End: 2021-09-10 | Stop reason: SDUPTHER

## 2021-09-10 RX ORDER — GLYCOPYRROLATE 0.2 MG/ML
INJECTION INTRAMUSCULAR; INTRAVENOUS PRN
Status: DISCONTINUED | OUTPATIENT
Start: 2021-09-10 | End: 2021-09-10 | Stop reason: SDUPTHER

## 2021-09-10 RX ORDER — SODIUM CHLORIDE, SODIUM LACTATE, POTASSIUM CHLORIDE, CALCIUM CHLORIDE 600; 310; 30; 20 MG/100ML; MG/100ML; MG/100ML; MG/100ML
INJECTION, SOLUTION INTRAVENOUS CONTINUOUS PRN
Status: DISCONTINUED | OUTPATIENT
Start: 2021-09-10 | End: 2021-09-10 | Stop reason: SDUPTHER

## 2021-09-10 RX ORDER — KETOROLAC TROMETHAMINE 30 MG/ML
INJECTION, SOLUTION INTRAMUSCULAR; INTRAVENOUS PRN
Status: DISCONTINUED | OUTPATIENT
Start: 2021-09-10 | End: 2021-09-10 | Stop reason: SDUPTHER

## 2021-09-10 RX ORDER — EPHEDRINE SULFATE/0.9% NACL/PF 50 MG/5 ML
SYRINGE (ML) INTRAVENOUS PRN
Status: DISCONTINUED | OUTPATIENT
Start: 2021-09-10 | End: 2021-09-10 | Stop reason: SDUPTHER

## 2021-09-10 RX ORDER — PHENYLEPHRINE HCL IN 0.9% NACL 1 MG/10 ML
SYRINGE (ML) INTRAVENOUS PRN
Status: DISCONTINUED | OUTPATIENT
Start: 2021-09-10 | End: 2021-09-10 | Stop reason: SDUPTHER

## 2021-09-10 RX ORDER — SODIUM CHLORIDE 9 MG/ML
25 INJECTION, SOLUTION INTRAVENOUS PRN
Status: DISCONTINUED | OUTPATIENT
Start: 2021-09-10 | End: 2021-09-10 | Stop reason: HOSPADM

## 2021-09-10 RX ORDER — FENTANYL CITRATE 50 UG/ML
INJECTION, SOLUTION INTRAMUSCULAR; INTRAVENOUS PRN
Status: DISCONTINUED | OUTPATIENT
Start: 2021-09-10 | End: 2021-09-10 | Stop reason: SDUPTHER

## 2021-09-10 RX ORDER — LIDOCAINE HYDROCHLORIDE AND EPINEPHRINE 10; 10 MG/ML; UG/ML
INJECTION, SOLUTION INFILTRATION; PERINEURAL
Status: COMPLETED | OUTPATIENT
Start: 2021-09-10 | End: 2021-09-10

## 2021-09-10 RX ORDER — DEXAMETHASONE SODIUM PHOSPHATE 4 MG/ML
INJECTION, SOLUTION INTRA-ARTICULAR; INTRALESIONAL; INTRAMUSCULAR; INTRAVENOUS; SOFT TISSUE PRN
Status: DISCONTINUED | OUTPATIENT
Start: 2021-09-10 | End: 2021-09-10 | Stop reason: SDUPTHER

## 2021-09-10 RX ORDER — MIDAZOLAM HYDROCHLORIDE 1 MG/ML
INJECTION INTRAMUSCULAR; INTRAVENOUS PRN
Status: DISCONTINUED | OUTPATIENT
Start: 2021-09-10 | End: 2021-09-10 | Stop reason: SDUPTHER

## 2021-09-10 RX ORDER — SODIUM CHLORIDE 0.9 % (FLUSH) 0.9 %
10 SYRINGE (ML) INJECTION PRN
Status: DISCONTINUED | OUTPATIENT
Start: 2021-09-10 | End: 2021-09-10 | Stop reason: HOSPADM

## 2021-09-10 RX ORDER — SODIUM CHLORIDE 9 MG/ML
INJECTION, SOLUTION INTRAVENOUS CONTINUOUS PRN
Status: DISCONTINUED | OUTPATIENT
Start: 2021-09-10 | End: 2021-09-10 | Stop reason: SDUPTHER

## 2021-09-10 RX ORDER — SODIUM CHLORIDE 9 MG/ML
INJECTION, SOLUTION INTRAVENOUS CONTINUOUS
Status: DISCONTINUED | OUTPATIENT
Start: 2021-09-10 | End: 2021-09-10 | Stop reason: HOSPADM

## 2021-09-10 RX ORDER — PROPOFOL 10 MG/ML
INJECTION, EMULSION INTRAVENOUS PRN
Status: DISCONTINUED | OUTPATIENT
Start: 2021-09-10 | End: 2021-09-10 | Stop reason: SDUPTHER

## 2021-09-10 RX ORDER — SODIUM CHLORIDE 0.9 % (FLUSH) 0.9 %
10 SYRINGE (ML) INJECTION EVERY 12 HOURS SCHEDULED
Status: DISCONTINUED | OUTPATIENT
Start: 2021-09-10 | End: 2021-09-10 | Stop reason: HOSPADM

## 2021-09-10 RX ADMIN — FENTANYL CITRATE 50 MCG: 50 INJECTION INTRAMUSCULAR; INTRAVENOUS at 11:15

## 2021-09-10 RX ADMIN — GLYCOPYRROLATE 0.1 MG: 0.2 INJECTION, SOLUTION INTRAMUSCULAR; INTRAVENOUS at 10:57

## 2021-09-10 RX ADMIN — Medication 200 MCG: at 11:30

## 2021-09-10 RX ADMIN — PROPOFOL 175 MG: 10 INJECTION, EMULSION INTRAVENOUS at 11:04

## 2021-09-10 RX ADMIN — Medication 100 MCG: at 11:22

## 2021-09-10 RX ADMIN — Medication 10 MG: at 11:46

## 2021-09-10 RX ADMIN — Medication 10 MG: at 11:39

## 2021-09-10 RX ADMIN — LIDOCAINE HYDROCHLORIDE 80 MG: 20 INJECTION, SOLUTION INFILTRATION; PERINEURAL at 11:04

## 2021-09-10 RX ADMIN — SODIUM CHLORIDE: 9 INJECTION, SOLUTION INTRAVENOUS at 10:57

## 2021-09-10 RX ADMIN — KETOROLAC TROMETHAMINE 30 MG: 30 INJECTION, SOLUTION INTRAMUSCULAR at 11:15

## 2021-09-10 RX ADMIN — MIDAZOLAM 1 MG: 1 INJECTION INTRAMUSCULAR; INTRAVENOUS at 11:04

## 2021-09-10 RX ADMIN — HYDROMORPHONE HYDROCHLORIDE 1 MG: 1 INJECTION, SOLUTION INTRAMUSCULAR; INTRAVENOUS; SUBCUTANEOUS at 12:04

## 2021-09-10 RX ADMIN — Medication 10 MG: at 11:51

## 2021-09-10 RX ADMIN — SODIUM CHLORIDE, SODIUM LACTATE, POTASSIUM CHLORIDE, AND CALCIUM CHLORIDE: .6; .31; .03; .02 INJECTION, SOLUTION INTRAVENOUS at 12:04

## 2021-09-10 RX ADMIN — MIDAZOLAM 1 MG: 1 INJECTION INTRAMUSCULAR; INTRAVENOUS at 10:57

## 2021-09-10 RX ADMIN — DEXAMETHASONE SODIUM PHOSPHATE 8 MG: 4 INJECTION, SOLUTION INTRAMUSCULAR; INTRAVENOUS at 11:04

## 2021-09-10 RX ADMIN — FENTANYL CITRATE 50 MCG: 50 INJECTION INTRAMUSCULAR; INTRAVENOUS at 10:57

## 2021-09-10 ASSESSMENT — PULMONARY FUNCTION TESTS
PIF_VALUE: 4
PIF_VALUE: 12
PIF_VALUE: 14
PIF_VALUE: 12
PIF_VALUE: 12
PIF_VALUE: 13
PIF_VALUE: 7
PIF_VALUE: 13
PIF_VALUE: 13
PIF_VALUE: 12
PIF_VALUE: 3
PIF_VALUE: 4
PIF_VALUE: 4
PIF_VALUE: 12
PIF_VALUE: 13
PIF_VALUE: 10
PIF_VALUE: 12
PIF_VALUE: 7
PIF_VALUE: 13
PIF_VALUE: 13
PIF_VALUE: 0
PIF_VALUE: 12
PIF_VALUE: 2
PIF_VALUE: 13
PIF_VALUE: 13
PIF_VALUE: 6
PIF_VALUE: 9
PIF_VALUE: 13
PIF_VALUE: 12
PIF_VALUE: 0
PIF_VALUE: 12
PIF_VALUE: 8
PIF_VALUE: 13
PIF_VALUE: 12
PIF_VALUE: 12
PIF_VALUE: 13
PIF_VALUE: 12
PIF_VALUE: 0
PIF_VALUE: 13
PIF_VALUE: 12
PIF_VALUE: 13
PIF_VALUE: 12
PIF_VALUE: 13
PIF_VALUE: 12
PIF_VALUE: 13
PIF_VALUE: 4
PIF_VALUE: 12
PIF_VALUE: 12
PIF_VALUE: 13
PIF_VALUE: 12

## 2021-09-10 ASSESSMENT — PAIN SCALES - GENERAL: PAINLEVEL_OUTOF10: 0

## 2021-09-10 ASSESSMENT — LIFESTYLE VARIABLES: SMOKING_STATUS: 0

## 2021-09-10 ASSESSMENT — PAIN - FUNCTIONAL ASSESSMENT: PAIN_FUNCTIONAL_ASSESSMENT: 0-10

## 2021-09-10 NOTE — DISCHARGE SUMMARY
Admit Dx-GUSI  Discharge Dx- same  Procedures- TVT-O , cystoscopy  Complications- none  Consultations-none  Disposition- Home  Follow up-2 weeks

## 2021-09-10 NOTE — PROGRESS NOTES
Vaginal Sweep Documentation   Vaginal sweep performed by Dr. Len Katz  at [phone]. No foreign objects or vaginal tears noted.

## 2021-09-10 NOTE — PROGRESS NOTES
OP Note  Preop Dx- Genuine Urinary Stress Incontinence  Postop Dx- Same  Procedure TVT-O, cystoscopy  Surgeon-ELLA Rede   Anesth-GET  EBL-50 cc  Findings-14 weeks uterus w multiple myomas, absent tubes, normal ovaries  Complications-2nd sling had to be replaced as first sling wqas displaced by a vaginal retractor laith gthe closure of the vaginal incision  Disp- to RR then probably home  Follow up- 2 weeks

## 2021-09-10 NOTE — ANESTHESIA PRE PROCEDURE
Department of Anesthesiology  Preprocedure Note       Name:  Reba Valencia   Age:  59 y.o.  :  1960                                          MRN:  [id number]         Date:  9/10/2021      Surgeon: Nohemy Gorves):  Cedric Juárez MD    Procedure: Procedure(s):  CYSTOSCOPY, TRANS VAGINAL TAPING OBTERATOR    Medications prior to admission:   Prior to Admission medications    Medication Sig Start Date End Date Taking? Authorizing Provider   estradiol (ESTRACE) 0.5 MG tablet Take 0.5 mg by mouth nightly  5/15/20  Yes Historical Provider, MD   medroxyPROGESTERone (PROVERA) 2.5 MG tablet Take 2.5 mg by mouth nightly 1/ tab nightly 20  Yes Historical Provider, MD   estradiol (ESTRACE) 1 MG tablet Take 1 mg by mouth nightly  17  Yes Historical Provider, MD   medroxyPROGESTERone (PROVERA) 2.5 MG tablet Take 2.5 mg by mouth nightly  17  Yes Historical Provider, MD   Multiple Vitamin (MULTIVITAMIN PO) Take 1 tablet by mouth nightly    Yes Historical Provider, MD   aspirin 81 MG EC tablet Take 81 mg by mouth nightly    Yes Historical Provider, MD   EPINEPHrine (EPIPEN 2-GODWIN) 0.3 MG/0.3ML SOAJ injection INJECT 0.3 INTO THE SKIN ONCE FOR UP TO ONE DOSE AS DIRECTED FOR ALLERGIC REACTION 21   Faby Ornelas MD   omeprazole (PRILOSEC) 20 MG delayed release capsule Take 1 capsule by mouth daily 21   Faby Ornelas MD       Current medications:    Current Facility-Administered Medications   Medication Dose Route Frequency Provider Last Rate Last Admin    0.9 % sodium chloride infusion   IntraVENous Continuous Basilio Soliman MD        sodium chloride flush 0.9 % injection 10 mL  10 mL IntraVENous 2 times per day Basilio Soliman MD        sodium chloride flush 0.9 % injection 10 mL  10 mL IntraVENous PRN Basilio Soliman MD        0.9 % sodium chloride infusion  25 mL IntraVENous PRN Basilio Soliman MD           Allergies:     Allergies   Allergen Reactions    Amoxicillin Hives and Itching  Bee Venom Anaphylaxis    Cefuroxime Axetil Hives and Itching    Penicillins Hives and Itching     All pcn related drugs    Ampicillin Hives       Problem List:    Patient Active Problem List   Diagnosis Code    Obesity E66.9    Vitamin D deficiency E55.9    Bee sting allergy Z91.030    Heart palpitations- pvc's on ekg R00.2    Primary osteoarthritis of right knee M17.11    H/O colonoscopy with polypectomy 9/6/18, Dr Apolinar Valdez, 1 polyp (2mm) Z98.890, Z86.010       Past Medical History:        Diagnosis Date    Bee sting allergy     Hyperlipidemia     h/o not current    Obesity     Prolonged emergence from general anesthesia     Stress incontinence        Past Surgical History:        Procedure Laterality Date    BLEPHAROPLASTY Bilateral 2014    Dr Jefferson Lopez  09/06/2018    dr Jackson Dows Left 2008    hammer toe repair; DR Cleaning Shadow LASIK Bilateral     Dr Barrett Campos GASTROPLASTY  12/7/10    Laparoscopic sleeve gastrectomy (Dr Vaibhav Grigsby)   [address] Left 1992       Social History:    Social History     Tobacco Use    Smoking status: Never Smoker    Smokeless tobacco: Never Used   Substance Use Topics    Alcohol use: Yes     Comment: on weekends, 2-3 drinks                                Counseling given: Not Answered      Vital Signs (Current):   Vitals:    09/09/21 0901 09/10/21 0956   BP:  (!) 142/90   Pulse:  70   Resp:  16   Temp:  98.9 °F (37.2 °C)   TempSrc:  Temporal   SpO2:  98%   Weight: 178 lb (80.7 kg) 171 lb 15.3 oz (78 kg)   Height: 5' 5.5\" (1.664 m) 5' 5.5\" (1.664 m)                                              BP Readings from Last 3 Encounters:   09/10/21 (!) 142/90   04/26/21 110/80   09/06/18 130/84       NPO Status: Time of last liquid consumption: 2000                        Time of last solid consumption: 2000                        Date of last liquid consumption: 09/09/21                        Date of last solid food consumption: 09/09/21    BMI:   Wt Readings from Last 3 Encounters:   09/10/21 171 lb 15.3 oz (78 kg)   04/26/21 178 lb (80.7 kg)   03/02/20 177 lb (80.3 kg)     Body mass index is 28.18 kg/m². CBC:   Lab Results   Component Value Date    WBC 5.4 08/10/2018    RBC 4.31 08/10/2018    HGB 13.7 08/10/2018    HCT 41.1 08/10/2018    MCV 95.4 08/10/2018    RDW 13.1 08/10/2018     08/10/2018       CMP:   Lab Results   Component Value Date     08/10/2018    K 4.1 08/10/2018     08/10/2018    CO2 25 08/10/2018    BUN 11 08/10/2018    CREATININE 0.6 08/10/2018    GFRAA >60 08/10/2018    GFRAA >60 05/08/2012    AGRATIO 1.7 08/10/2018    LABGLOM >60 08/10/2018    GLUCOSE 76 06/29/2021    PROT 7.3 08/10/2018    PROT 7.1 05/08/2012    CALCIUM 9.4 08/10/2018    BILITOT 0.5 08/10/2018    ALKPHOS 117 08/10/2018    AST 20 08/10/2018    ALT 15 08/10/2018       POC Tests: No results for input(s): POCGLU, POCNA, POCK, POCCL, POCBUN, POCHEMO, POCHCT in the last 72 hours. Coags: No results found for: PROTIME, INR, APTT    HCG (If Applicable):   Lab Results   Component Value Date    PREGTESTUR Neg 12/07/2010        ABGs: No results found for: PHART, PO2ART, QQQ0ZQD, SYK0EQI, BEART, Y5CSUMFM     Type & Screen (If Applicable):  Lab Results   Component Value Date    LABABO A 11/30/2010    [address] Positive 11/30/2010       Drug/Infectious Status (If Applicable):  No results found for: HIV, HEPCAB    COVID-19 Screening (If Applicable):   Lab Results   Component Value Date    COVID19 Not Detected 04/21/2020           Anesthesia Evaluation     History of anesthetic complications: h/o prolonged emergence.   Airway: Mallampati: III  TM distance: >3 FB   Neck ROM: full  Mouth opening: > = 3 FB Dental: normal exam         Pulmonary:Negative Pulmonary ROS and normal exam        (-) COPD, asthma, sleep apnea, rhonchi, wheezes, rales and not a current smoker                           Cardiovascular:  Exercise tolerance: good (>4 METS),       (-) hypertension,  SPARKS and no hyperlipidemiaDysrhythmias: PVC's. Rhythm: regular  Rate: normal                 ROS comment: Patient wants to know when she can return to [employer] classes following procedure     Neuro/Psych:   Negative Neuro/Psych ROS              GI/Hepatic/Renal:   (+) GERD (diet controlled): well controlled,      Morbid obesity: s/p gastric sleeve. Endo/Other: Negative Endo/Other ROS       (-) diabetes mellitus, hypothyroidism, hyperthyroidism               Abdominal:         (-) obese Abdomen: soft. Vascular: negative vascular ROS. Other Findings:           Anesthesia Plan      general     ASA 1       Induction: intravenous. MIPS: Postoperative opioids intended and Prophylactic antiemetics administered. Anesthetic plan and risks discussed with patient. Use of blood products discussed with patient whom consented to blood products. Plan discussed with CRNA. This pre-anesthesia assessment may be used as a history and physical.    DOS STAFF ADDENDUM:    Pt seen and examined, chart reviewed (including anesthesia, drug and allergy history). No interval changes to history and physical examination. Anesthetic plan, risks, benefits, alternatives, and personnel involved discussed with patient. Patient verbalized an understanding and agrees to proceed.       Bette Mosher MD  September 10, 2021  10:10 AM

## 2021-09-10 NOTE — ANESTHESIA POSTPROCEDURE EVALUATION
Department of Anesthesiology  Postprocedure Note    Patient: Krystal Trammell  MRN: [id number]  YOB: 1960  Date of evaluation: 9/10/2021  Time:  3:22 PM     Procedure Summary     Date: 09/10/21 Room / Location: [address] [place] [address] / [facility]    Anesthesia Start: 9090 Anesthesia Stop: 2292    Procedure: CYSTOSCOPY, TRANS VAGINAL TAPING OBTERATOR (N/A Pelvis) Diagnosis:       LEVON (stress urinary incontinence, female)      (STRESS URINARY INCONTINENCE)    Surgeons: Moody Collet, MD Responsible Provider: Daivd Bernheim, MD    Anesthesia Type: general ASA Status: 1          Anesthesia Type: general    Eladio Phase I: Eladio Score: 10    Eladio Phase II: Eladio Score: 10    Last vitals: Reviewed and per EMR flowsheets. Anesthesia Post Evaluation    Patient location during evaluation: PACU  Patient participation: complete - patient participated  Level of consciousness: awake and alert  Airway patency: patent  Nausea & Vomiting: no nausea and no vomiting  Complications: no  Cardiovascular status: blood pressure returned to baseline and hemodynamically stable  Respiratory status: room air, spontaneous ventilation and nonlabored ventilation  Hydration status: stable  Comments: Ms. Gabbi Saunders reports appropriate incisional soreness in PACU. She denies any nausea.        Daivd Bernheim, MD  9/10/2021 3:23 PM

## 2021-09-10 NOTE — PROGRESS NOTES
Vaginal Sweep Documentation     Vaginal prep sponge count performed by MORENA Cassidy RN and [facility]. Count correct.

## 2021-09-10 NOTE — PROGRESS NOTES
Pt arrived to pacu from OR asleep awakes to v/o. VSS on monitor. O2 on 3L nc. Incision in labia cdi with surgical glue. Alegre catheter placed in OR. Pt falls easily back to sleep.

## 2021-09-14 NOTE — PROCEDURES
[address]                                 PROCEDURE NOTE    PATIENT NAME: Stephen Marshall                     :        1960  MED REC NO:   [id number]                          ROOM:  ACCOUNT NO:   [de-identified]                           ADMIT DATE: 09/10/2021  PROVIDER:     Rose Marie Pederson MD      DATE OF PROCEDURE:  09/10/2021    PREOPERATIVE DIAGNOSIS:  Genuine urinary stress incontinence. POSTOPERATIVE DIAGNOSIS:  Genuine urinary stress incontinence. OPERATION PERFORMED:  Transvaginal tape-obturator and cystoscopy using  the Obtryx II Halo system. SURGEON:  Rose Marie Pederson MD    ANESTHESIA:  General by LMA. COMPLICATIONS:  None. ESTIMATED BLOOD LOSS:  Less than 50 mL. INDICATIONS:  The patient is a 57-year-old white female who came to the  office complaining of progressively worsening genuine stress  incontinence. Incontinence was becoming progressively worse. She is  wearing a pad daily and soaking the pad. She is starting to avoid  activities due to stress incontinence. She underwent urodynamics and  confirmed genuine urinary stress incontinence and did not show any  evidence of detrusor instability. She came to the office and requested  to proceed with surgical correction. The risks and benefits of surgery  were discussed including the risk of bleeding or infection, possibility  of injury to the bowel, bladder, ureters or other organs; possibility of  the hematoma or bleeding. The success rate of 85% was quoted along with  a continued incontinence rate of 10% and a urinary retention rate of 5%. She was also instructed to watch her physical activity for the first 2  to 4 weeks after surgery or the sling may move and decrease the  long-term effectiveness. The patient understood and wished to proceed.     PROCEDURE:  After she underwent adequate anesthesia, the patient was  placed in modified dorsal lithotomy position. A time-out was held,  confirming the patient and the procedure. She was not able to tolerate  IV antibiotics and was given a prescription of Bactrim to be taken at  home following the surgery. Attention was first turned to the vagina. The vaginal retractors were  placed. The _____ sites were marked just below the adductor longus  tendon in the same plane as the clitoris. These areas were infiltrated  with 3 to 5 mL of 1% lidocaine. Then an area approximately 2 cm from  the opening at the urethral meatus was infiltrated with the 1% lidocaine  as well. A vertical incision was made using a #15 blade. Metzenbaums  were used to create a tunnel and perforate the obturator foramen. The  incision was then digitally enlarged to allow the finger to go to the  point. Stab incisions were made at the previously marked sites. The  Obtryx Halo trocar was then introduced and was set onto the vaginal  _____ and brought out vaginally and the sling was brought out in the  reverse fashion. Identical procedure was followed on the opposite side. The sling was placed without difficulty. Alegre was removed. Cystoscopy  was performed showing an intact bladder and the ureters were egressing  bilaterally. The sling was then adjusted and trimmed. Unfortunately,  when the vaginal retractors were placed to repair the vaginal incision,  the sling was displaced from its initial placement site and could not be  tightened as the distal ends of the sling had already been trimmed, so  the procedure was repeated. The sling was removed. The right side was  placed without difficulty. The left side was placed without difficulty. It was adjusted and trimmed. Cystoscopy was repeated, which showed the  intact bladder. The vagina was then closed with a running suture of 3-0 Vicryl. Minimal  bleeding was encountered.   The patient will have a voiding trial and  then will be discharged home.        Mary Grace Arreaga MD    D: 09/13/2021 15:02:07       T: 09/13/2021 20:04:47     [email or web address]  Job#: [id number]     Doc#: [id number]    CC:

## 2021-10-12 ENCOUNTER — TELEPHONE (OUTPATIENT)
Dept: FAMILY MEDICINE CLINIC | Age: 61
End: 2021-10-12

## 2021-10-12 NOTE — TELEPHONE ENCOUNTER
No order is needed for COVID testing  She will need to have done at pharmacy as we are only scheduling symptomatic pts at this time  Thank you

## 2021-11-23 ENCOUNTER — OFFICE VISIT (OUTPATIENT)
Dept: DERMATOLOGY | Age: 61
End: 2021-11-23
Payer: COMMERCIAL

## 2021-11-23 VITALS — TEMPERATURE: 97.4 F

## 2021-11-23 DIAGNOSIS — L57.0 AK (ACTINIC KERATOSIS): ICD-10-CM

## 2021-11-23 DIAGNOSIS — D48.5 NEOPLASM OF UNCERTAIN BEHAVIOR OF SKIN: ICD-10-CM

## 2021-11-23 DIAGNOSIS — Z80.8 FAMILY HISTORY OF MALIGNANT MELANOMA: ICD-10-CM

## 2021-11-23 DIAGNOSIS — L81.4 LENTIGINES: ICD-10-CM

## 2021-11-23 DIAGNOSIS — L82.1 SEBORRHEIC KERATOSIS: ICD-10-CM

## 2021-11-23 DIAGNOSIS — D22.9 MULTIPLE NEVI: Primary | ICD-10-CM

## 2021-11-23 PROCEDURE — 11102 TANGNTL BX SKIN SINGLE LES: CPT | Performed by: DERMATOLOGY

## 2021-11-23 PROCEDURE — 99213 OFFICE O/P EST LOW 20 MIN: CPT | Performed by: DERMATOLOGY

## 2021-11-23 PROCEDURE — 17003 DESTRUCT PREMALG LES 2-14: CPT | Performed by: DERMATOLOGY

## 2021-11-23 PROCEDURE — 17000 DESTRUCT PREMALG LESION: CPT | Performed by: DERMATOLOGY

## 2021-11-23 NOTE — PROGRESS NOTES
[facility] Dermatology  iLban Lakhani MD  [facility]  1960    59 y.o. female     Date of Visit: 11/23/2021    Chief Complaint: moles, f/u AK's  Chief Complaint   Patient presents with    Lesion(s)     TBSE, a few small crusty areas of concern on arms. Hx of SK, and AK's Family      Last seen: 8-2020    History of Present Illness:    1. Here for evaluation of multiple asx pigmented lesions on the trunk and extremities, present for many years; no change in size/shape/color of any lesions; no bleeding lesions. 2. She has a few rough asx lesions on the chest.  Asx. Hx of AK's - no probs with previous sites. 3. She has a concerning pigmented lesion on the R chest.  Asx. She typically goes on a diving trip at least yearly - [facility], [place], [place]. She wears sunscreen but gets a lot of sun exposure. No personal hx of skin cancer. Father with hx of 2 melanomas. No other known family hx of skin cancer. She works at [employer].    Nancy Guo 14 well, good sense of health. Skin: No changing moles or lesions. Past Medical History, Family History, Surgical History, Medications and Allergies reviewed.     Past Medical History:   Diagnosis Date    Bee sting allergy     Hyperlipidemia     h/o not current    Obesity     Prolonged emergence from general anesthesia     Stress incontinence        Past Surgical History:   Procedure Laterality Date    BLEPHAROPLASTY Bilateral 2014    Dr Grazyna Merrill  09/06/2018    dr Kiara Vieira Left 2008    hammer toe repair; DR Pearl Mims LASIK Bilateral     Dr Andreina Pérez GASTROPLASTY  12/7/10    Laparoscopic sleeve gastrectomy (Dr Ashlyn Mcdaniel)   [facility]    URETHRAL SURGERY N/A 9/10/2021    CYSTOSCOPY, TRANS VAGINAL TAPING OBTERATOR performed by Axel Reyes MD at [facility]       Outpatient Medications Marked as Taking for the 11/23/21 encounter (Office Visit) with Nina Hunt MD   Medication Sig Dispense Refill    omeprazole (PRILOSEC) 20 MG delayed release capsule TAKE 1 CAPSULE BY MOUTH ONE TIME A DAY 30 capsule 1    EPINEPHrine (EPIPEN 2-GODWIN) 0.3 MG/0.3ML SOAJ injection INJECT 0.3 INTO THE SKIN ONCE FOR UP TO ONE DOSE AS DIRECTED FOR ALLERGIC REACTION 2 each 0    estradiol (ESTRACE) 0.5 MG tablet Take 0.5 mg by mouth nightly       medroxyPROGESTERone (PROVERA) 2.5 MG tablet Take 2.5 mg by mouth nightly 1/2 tab nightly      medroxyPROGESTERone (PROVERA) 2.5 MG tablet Take 2.5 mg by mouth nightly       Multiple Vitamin (MULTIVITAMIN PO) Take 1 tablet by mouth nightly       aspirin 81 MG EC tablet Take 81 mg by mouth nightly          Allergies   Allergen Reactions    Amoxicillin Hives and Itching    Bee Venom Anaphylaxis    Cefuroxime Axetil Hives and Itching    Penicillins Hives and Itching     All pcn related drugs    Ampicillin Hives         Physical Examination     Gen, well-appearing  FSE today    trunk and extremities with scattered brown macules and papules   Chest with 2 erythematous roughly scaled macules  R chest with ill-defined mottled unevenly pigmented brown macule            Assessment and Plan     1. Benign-appearing nevi, SK's and family hx of melanoma   - educ re ABCD's of MM   educ sun protection SPF 30+  encouraged skin check yearly (sooner if indicated), self checks    2. Few AK's on the chest  - 2 lesion(s) treated with liquid nitrogen x 2 cycles. Patient educated on risk of blister, hypopigmentation/scar and wound care. 3. R chest - r/o lentigo vs LM  - Shave biopsy performed after verbal consent obtained. Patient educated regarding risk of bleeding, infection, scar and educated on wound care. Skin cleansed with alcohol pad and site anesthetized with lido + epi. Aluminum chloride applied to site for hemostasis. Petrolatum ointment and bandage applied.   Specimen bottle labeled with patient information and site and specimen sent to dermpath. F/u 1 year, sooner prn.

## 2021-11-30 LAB — DERMATOLOGY PATHOLOGY REPORT: NORMAL

## 2022-07-12 ENCOUNTER — HOSPITAL ENCOUNTER (OUTPATIENT)
Dept: WOMENS IMAGING | Age: 62
Discharge: HOME | End: 2022-07-12
Payer: COMMERCIAL

## 2022-07-12 DIAGNOSIS — Z12.31 BREAST CANCER SCREENING BY MAMMOGRAM: ICD-10-CM

## 2022-07-12 PROCEDURE — 77063 BREAST TOMOSYNTHESIS BI: CPT

## 2022-08-29 LAB
CHOLESTEROL, TOTAL: 207 MG/DL (ref 0–199)
GLUCOSE BLD-MCNC: 86 MG/DL (ref 70–99)
HDLC SERPL-MCNC: 66 MG/DL (ref 40–60)
LDL CHOLESTEROL CALCULATED: 115 MG/DL
TRIGL SERPL-MCNC: 129 MG/DL (ref 0–150)

## 2022-11-09 ENCOUNTER — OFFICE VISIT (OUTPATIENT)
Dept: UROGYNECOLOGY | Age: 62
End: 2022-11-09
Payer: COMMERCIAL

## 2022-11-09 VITALS
OXYGEN SATURATION: 94 % | TEMPERATURE: 98.2 F | SYSTOLIC BLOOD PRESSURE: 115 MMHG | HEART RATE: 85 BPM | DIASTOLIC BLOOD PRESSURE: 85 MMHG | RESPIRATION RATE: 16 BRPM

## 2022-11-09 DIAGNOSIS — T83.712S EROSION OF IMPLANTED URETHRAL MESH TO SURROUNDING ORGAN OR TISSUE, SEQUELA: Primary | ICD-10-CM

## 2022-11-09 PROCEDURE — 99203 OFFICE O/P NEW LOW 30 MIN: CPT | Performed by: OBSTETRICS & GYNECOLOGY

## 2022-11-09 RX ORDER — SODIUM CHLORIDE 0.9 % (FLUSH) 0.9 %
5-40 SYRINGE (ML) INJECTION EVERY 12 HOURS SCHEDULED
OUTPATIENT
Start: 2022-11-09

## 2022-11-09 RX ORDER — SODIUM CHLORIDE 9 MG/ML
INJECTION, SOLUTION INTRAVENOUS PRN
OUTPATIENT
Start: 2022-11-09

## 2022-11-09 RX ORDER — SODIUM CHLORIDE 0.9 % (FLUSH) 0.9 %
5-40 SYRINGE (ML) INJECTION PRN
OUTPATIENT
Start: 2022-11-09

## 2022-11-09 NOTE — PROGRESS NOTES
2022      HPI:     Name: Matt Mann  YOB: 1960    CC: Patient is a 57 y.o. female who is seen in consultation from No ref. provider found   for evaluation of  pain with intercourse . She reports there is something wrong with her suburethral sling. HPI:  Bladder control problem: Yes  How many months have you had a bladder problem? 2 years  Do you use pads to absorb lost urine? Yes. 1  How many trips do you make to the bathroom during the day? 4-5  How many times do you wake at night to go to the bathroom? 0  Do you ever wet the bed while asleep? No  Are there times when you cannot make it to the bathroom on time? No  Does sound, sight, or feel of running water cause you to lose urine? No  How many ounces of liquid do you consume daily? 40-60 oz  How many drinks containing caffeine do you consume daily? 1  Which best describes urine loss: (Check all that apply)  [] I lose urine during coughing, sneezing, running, lifting  [] I lose urine with changes in posture, standing, walking  [] I lose urine continuously such that I am constantly wet  [] I have sudden, urgent needs without the ability to make it to the bathroom  Have you seen a physician for complaints of urine loss? Yes   Dr. Quincy Gallego  Have you taken medication to prevent urine loss? No     Bladder emptying problems:  No   Prolapse/Vaginal Support problems: No   Bowel problem(s): No  Sexual History:  reports being sexually active and has had partner(s) who are male.   Pelvic Pain:  No   Ob/Gyn History:    OB History    Para Term  AB Living       0         SAB IAB Ectopic Molar Multiple Live Births                   Past Medical History:   Past Medical History:   Diagnosis Date    Bee sting allergy     Hyperlipidemia     h/o not current    Obesity     Prolonged emergence from general anesthesia     Stress incontinence      Past Surgical History:   Past Surgical History:   Procedure Laterality Date    BLEPHAROPLASTY Bilateral 2014    Dr Mae Harvey  09/06/2018    dr Adry Alves Left 2008    hammer toe repair; DR Chatman Rom Bilateral     Dr Srikanth Gutiérrez GASTROPLASTY  12/7/10    Laparoscopic sleeve gastrectomy (Dr Kristi Álvarez)    [id number] [facility] N/A 9/10/2021    CYSTOSCOPY, TRANS VAGINAL TAPING OBTERATOR performed by Karen Tran MD at [address] [place] [address] Drive Left 1992     Allergies: Allergies   Allergen Reactions    Amoxicillin Hives and Itching    Bee Venom Anaphylaxis    Cefuroxime Axetil Hives and Itching    Penicillins Hives and Itching     All pcn related drugs    Ampicillin Hives     Current Medications:  Current Outpatient Medications   Medication Sig Dispense Refill    EPINEPHrine (EPIPEN 2-GODWIN) 0.3 MG/0.3ML SOAJ injection INJECT 0.3 INTO THE SKIN ONCE FOR UP TO ONE DOSE AS DIRECTED FOR ALLERGIC REACTION 2 each 0    Multiple Vitamin (MULTIVITAMIN PO) Take 1 tablet by mouth nightly       aspirin 81 MG EC tablet Take 81 mg by mouth nightly        No current facility-administered medications for this visit.      Social History:   Social History     Socioeconomic History    Marital status:      Spouse name: Kayden Lindsey    Number of children: 0    Years of education: Not on file    Highest education level: Not on file   Occupational History    Occupation: RN     Employer: [employer]   Tobacco Use    Smoking status: Never    Smokeless tobacco: Never   Vaping Use    Vaping Use: Never used   Substance and Sexual Activity    Alcohol use: Yes     Comment: on weekends, 2-3 drinks    Drug use: Never    Sexual activity: Yes     Partners: Male     Comment:  Kayden Lindsey   Other Topics Concern    Not on file   Social History Narrative    Lives with , 1 dog     Social Determinants of Health     Financial Resource Strain: Not on file   Food Insecurity: Not on file   Transportation Needs: Not on file   Physical Activity: Not on file   Stress: Not on file surgical and non-surgical options. The patient elected to move forward with surgery because of worsening symptoms. The risks and benefits of surgery including but not limited to bleeding, infection, injury to bowel, bladder, ureter, or other internal organs, transfusion, pain, bowel dysfunction, urinary incontinence, and sexual dysfunction were discussed at length. All questions were answered to the patients satisfaction. The patient elected to undergo cystourethroscopy and vaginal exploration and removal of eroded mesh material .  The risk and benefits of synthetic material, including mesh, were explained to the patient and all questions were answered. Preop labs were ordered    No orders of the defined types were placed in this encounter. No orders of the defined types were placed in this encounter.       Amadou Tyson MD

## 2022-11-18 NOTE — PROGRESS NOTES
C-Difficile admission screening and protocol:       * Admitted with diarrhea? [] YES    [x]  NO     *Prior history of C-Diff. In last 3 months? [] YES    [x]  NO     *Antibiotic use in the past 6-8 weeks? [x]  NO    []  YES                 If yes, which ANTIBIOTIC AND REASON______     *Prior hospitalization or nursing home in the last month?  []  YES    [x]  NO Detail Level: Zone Detail Level: Detailed

## 2022-11-28 ENCOUNTER — OFFICE VISIT (OUTPATIENT)
Dept: DERMATOLOGY | Age: 62
End: 2022-11-28
Payer: COMMERCIAL

## 2022-11-28 DIAGNOSIS — D22.9 MULTIPLE NEVI: Primary | ICD-10-CM

## 2022-11-28 DIAGNOSIS — D48.5 NEOPLASM OF UNCERTAIN BEHAVIOR OF SKIN: ICD-10-CM

## 2022-11-28 DIAGNOSIS — Z80.8 FAMILY HISTORY OF MALIGNANT MELANOMA: ICD-10-CM

## 2022-11-28 DIAGNOSIS — L81.4 LENTIGINES: ICD-10-CM

## 2022-11-28 DIAGNOSIS — L57.0 AK (ACTINIC KERATOSIS): ICD-10-CM

## 2022-11-28 DIAGNOSIS — L82.1 SEBORRHEIC KERATOSIS: ICD-10-CM

## 2022-11-28 PROCEDURE — 11103 TANGNTL BX SKIN EA SEP/ADDL: CPT | Performed by: DERMATOLOGY

## 2022-11-28 PROCEDURE — 99213 OFFICE O/P EST LOW 20 MIN: CPT | Performed by: DERMATOLOGY

## 2022-11-28 PROCEDURE — 17000 DESTRUCT PREMALG LESION: CPT | Performed by: DERMATOLOGY

## 2022-11-28 PROCEDURE — 17003 DESTRUCT PREMALG LES 2-14: CPT | Performed by: DERMATOLOGY

## 2022-11-28 PROCEDURE — 11102 TANGNTL BX SKIN SINGLE LES: CPT | Performed by: DERMATOLOGY

## 2022-11-28 NOTE — PATIENT INSTRUCTIONS

## 2022-11-28 NOTE — PROGRESS NOTES
[facility]  Gerard Camargo MD  [facility],[address]  1960    57 y.o. female     Date of Visit: 11/28/2022    Chief Complaint: moles, f/u AK's  Chief Complaint   Patient presents with    Skin Exam     Last seen:     History of Present Illness:    1. Here for evaluation of multiple asx pigmented lesions on the trunk and extremities, present for many years; no change in size/shape/color of any lesions; no bleeding lesions. 2. She has a few rough asx lesions on the face. Asx. Hx of AK's - no probs with previous sites. 3. She has a few concerning pink lesions on the upper back. Scratching at these areas. She typically goes on a diving trip at least yearly - [facility], [place], [place]. She wears sunscreen but gets a lot of sun exposure. No personal hx of skin cancer. Father with hx of 2 melanomas. No other known family hx of skin cancer. She works at [employer].    Nancy Guo 14 well, good sense of health. Skin: No changing moles or lesions. Past Medical History, Family History, Surgical History, Medications and Allergies reviewed.     Past Medical History:   Diagnosis Date    Bee sting allergy     Hyperlipidemia     h/o not current    Obesity     Prolonged emergence from general anesthesia     Stress incontinence        Past Surgical History:   Procedure Laterality Date    BLEPHAROPLASTY Bilateral 2014    Dr Casa Anthony    COLONOSCOPY  09/06/2018    dr Jena Swain Left 2008    hammer toe repair; DR Collette Check Bilateral     Dr Ulisses Fonseca GASTROPLASTY  12/7/10    Laparoscopic sleeve gastrectomy (Dr Danitza Esparza)    [id number] [facility] N/A 9/10/2021    CYSTOSCOPY, TRANS VAGINAL TAPING OBTERATOR performed by Kate Anders MD at [facility]       Outpatient Medications Marked as Taking for the 11/28/22 encounter (Office Visit) with Abi Alvarado MD   Medication Sig Dispense Refill    Multiple Vitamin (MULTIVITAMIN PO) Take 1 tablet by mouth nightly       aspirin 81 MG EC tablet Take 81 mg by mouth nightly          Allergies   Allergen Reactions    Amoxicillin Hives and Itching    Bee Venom Anaphylaxis    Cefuroxime Axetil Hives and Itching    Penicillins Hives and Itching     All pcn related drugs    Ampicillin Hives         Physical Examination     Gen, well-appearing  FSE today    trunk and extremities with scattered brown macules and papules   R upper lip and L cheek with erythematous roughly scaled macules  R upper back (just R of midline) with crusted bright pink scaled macule  R mid/central back - bright pink scaled macule          Assessment and Plan     1. Benign-appearing nevi, SK's and family hx of melanoma   - Monitor for ABCD's of MM and si/sx of NMSC  Continue sun protection - OTC sunscreen with SPF 30-50+ recommended and reviewed usage  Encouraged skin check yearly (sooner if indicated), self checks    2. Few AK's on the R upper lip and L cheek - 3 total  - 3 -  lesion(s) treated with liquid nitrogen with cryac or swab. Treated with 2 cycles for 1-5 seconds each after consent from patient. Patient educated on risk of blister, hypopigmentation/scar and wound care. Tolerated well. 3. R upper back (just R of midline) and R central/mid back - r/o BCC vs excoriation  - 2 Shave biopsy performed after verbal consent obtained. Patient educated regarding risk of bleeding, infection, scar and educated on wound care. Skin cleansed with alcohol pad and site anesthetized with lido + epi. Aluminum chloride applied to site for hemostasis. Petrolatum ointment and bandage applied. Specimen bottle labeled with patient information and site and specimen sent to dermpath. *R chest - solar lentigo  on bx        F/u 1 year, sooner prn.

## 2022-12-01 LAB — DERMATOLOGY PATHOLOGY REPORT: ABNORMAL

## 2022-12-12 ENCOUNTER — OFFICE VISIT (OUTPATIENT)
Dept: FAMILY MEDICINE CLINIC | Age: 62
End: 2022-12-12
Payer: COMMERCIAL

## 2022-12-12 VITALS
HEART RATE: 86 BPM | OXYGEN SATURATION: 97 % | TEMPERATURE: 98.4 F | BODY MASS INDEX: 27.46 KG/M2 | HEIGHT: 65 IN | SYSTOLIC BLOOD PRESSURE: 108 MMHG | RESPIRATION RATE: 16 BRPM | DIASTOLIC BLOOD PRESSURE: 60 MMHG | WEIGHT: 164.8 LBS

## 2022-12-12 DIAGNOSIS — Z01.818 PREOP EXAMINATION: Primary | ICD-10-CM

## 2022-12-12 DIAGNOSIS — R00.2 HEART PALPITATIONS: ICD-10-CM

## 2022-12-12 DIAGNOSIS — T83.712S EROSION OF SUBURETHRAL SLING, SEQUELA: ICD-10-CM

## 2022-12-12 PROCEDURE — 99214 OFFICE O/P EST MOD 30 MIN: CPT | Performed by: FAMILY MEDICINE

## 2022-12-12 SDOH — ECONOMIC STABILITY: FOOD INSECURITY: WITHIN THE PAST 12 MONTHS, THE FOOD YOU BOUGHT JUST DIDN'T LAST AND YOU DIDN'T HAVE MONEY TO GET MORE.: NEVER TRUE

## 2022-12-12 SDOH — ECONOMIC STABILITY: FOOD INSECURITY: WITHIN THE PAST 12 MONTHS, YOU WORRIED THAT YOUR FOOD WOULD RUN OUT BEFORE YOU GOT MONEY TO BUY MORE.: NEVER TRUE

## 2022-12-12 ASSESSMENT — PATIENT HEALTH QUESTIONNAIRE - PHQ9
SUM OF ALL RESPONSES TO PHQ QUESTIONS 1-9: 0
SUM OF ALL RESPONSES TO PHQ QUESTIONS 1-9: 0
2. FEELING DOWN, DEPRESSED OR HOPELESS: 0
1. LITTLE INTEREST OR PLEASURE IN DOING THINGS: 0
SUM OF ALL RESPONSES TO PHQ QUESTIONS 1-9: 0
SUM OF ALL RESPONSES TO PHQ QUESTIONS 1-9: 0
SUM OF ALL RESPONSES TO PHQ9 QUESTIONS 1 & 2: 0

## 2022-12-12 ASSESSMENT — ENCOUNTER SYMPTOMS
ALLERGIC/IMMUNOLOGIC NEGATIVE: 1
GASTROINTESTINAL NEGATIVE: 1
EYES NEGATIVE: 1
RESPIRATORY NEGATIVE: 1

## 2022-12-12 ASSESSMENT — SOCIAL DETERMINANTS OF HEALTH (SDOH): HOW HARD IS IT FOR YOU TO PAY FOR THE VERY BASICS LIKE FOOD, HOUSING, MEDICAL CARE, AND HEATING?: NOT HARD AT ALL

## 2022-12-12 NOTE — Clinical Note
Jack Mejia Brandy is a good surgical candidate, no risk factors for surgery, is medically cleared. Thanks!

## 2022-12-12 NOTE — PROGRESS NOTES
Subjective:   PREOPERATIVE EVALUATION     Dieudonne Lezama is a 57 y.o.  female who presents to the office today for a preoperative consultation at the request of surgeon Dr Yana Ceja who plans on performing bladder sling removal on January 4. Duration of problem: TVT/bladder sling placed 9/21 by Dr Ying Tapia for stress incontinence. There was a complication with placement and it is causing pain, jed with intercourse. It does functioning well for incontinence, however. Assoc sx are: green drainage/dc from vagina- thought to be erosion of the sling  Alleviating factors are: none    This consultation is requested for the specific conditions prompting preoperative evaluation (i.e. because of potential affect on operative risk):     Diabetes: no  Coronary Artery Disease: no  COPD: no  Tobacco use? no  Recent illness? No    No daily med use. She has elected to take asa 81 on her own. No hx of CAD, TIA, CVA or PVD. Patient Active Problem List   Diagnosis    Vitamin D deficiency    Bee sting allergy    Heart palpitations- pvc's on ekg    Primary osteoarthritis of right knee    H/O colonoscopy with polypectomy 9/6/18, Dr Verónica Vail, 1 polyp (2mm)    LEVON (stress urinary incontinence, female)        Planned anesthesia is IV sedation. The patient has the following knownanesthesia issues: SLOW TO RECOVER FROM SEDATION.   Patient has a bleeding risk of : no recent abnormal bleeding      Past Medical History:   Diagnosis Date    Bee sting allergy     Hyperlipidemia     h/o not current    Obesity     Prolonged emergence from general anesthesia     Stress incontinence      Past Surgical History:   Procedure Laterality Date    BLEPHAROPLASTY Bilateral 2014    Dr Montana Oakley    COLONOSCOPY  09/06/2018    dr Adry Alves Left 2008    hammer toe repair; DR Lurdes Giraldo Bilateral     Dr Srikanth Gutiérrez GASTROPLASTY  12/7/10    Laparoscopic sleeve gastrectomy (Dr Kristi Álvarez)    TUBAL LIGATION  1990    URETHRAL SURGERY N/A 9/10/2021    CYSTOSCOPY, TRANS VAGINAL TAPING OBTERATOR performed by Tanvi Rolon MD at [facility] 1992     Family History   Problem Relation Age of Onset    Cancer Father         skin     Current Outpatient Medications   Medication Sig Dispense Refill    EPINEPHrine (EPIPEN 2-GODWIN) 0.3 MG/0.3ML SOAJ injection INJECT 0.3 INTO THE SKIN ONCE FOR UP TO ONE DOSE AS DIRECTED FOR ALLERGIC REACTION 2 each 0    Multiple Vitamin (MULTIVITAMIN PO) Take 1 tablet by mouth nightly       aspirin 81 MG EC tablet Take 81 mg by mouth nightly        No current facility-administered medications for this visit. 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 HENT: Negative. Eyes: Negative. Respiratory: Negative. Cardiovascular: Negative. Gastrointestinal: Negative. Endocrine: Negative. Genitourinary: Negative. Musculoskeletal: Negative. Skin: Negative. Allergic/Immunologic: Negative. Neurological: Negative. Hematological: Negative. Psychiatric/Behavioral: Negative.           Objective:     Vitals:    12/12/22 1042   BP: 108/60   Site: Right Upper Arm   Position: Sitting   Cuff Size: Large Adult   Pulse: 86   Resp: 16   Temp: 98.4 °F (36.9 °C)   TempSrc: Oral   SpO2: 97%   Weight: 164 lb 12.8 oz (74.8 kg)   Height: 5' 5\" (1.651 m)     Physical Exam  Vitals and nursing note reviewed. Constitutional:       General: She is not in acute distress. Appearance: Normal appearance. She is well-developed. She is not toxic-appearing or diaphoretic. HENT:      Head: Normocephalic and atraumatic. Right Ear: Tympanic membrane, ear canal and external ear normal. There is no impacted cerumen. Left Ear: Tympanic membrane, ear canal and external ear normal. There is no impacted cerumen. Nose: Nose normal. No congestion or rhinorrhea. Mouth/Throat:      Mouth: Mucous membranes are moist.      Pharynx: Oropharynx is clear. No oropharyngeal exudate or posterior oropharyngeal erythema. Eyes:      General:         Right eye: No discharge. Left eye: No discharge. Conjunctiva/sclera: Conjunctivae normal.      Pupils: Pupils are equal, round, and reactive to light. Neck:      Thyroid: No thyromegaly. Trachea: No tracheal deviation. Cardiovascular:      Rate and Rhythm: Normal rate and regular rhythm. Heart sounds: Normal heart sounds. No murmur heard. No friction rub. No gallop. Pulmonary:      Effort: Pulmonary effort is normal. No respiratory distress. Breath sounds: Normal breath sounds. No wheezing or rales. Chest:      Chest wall: No tenderness. Musculoskeletal:         General: Normal range of motion. Cervical back: Normal range of motion and neck supple. Lymphadenopathy:      Cervical: No cervical adenopathy. Upper Body:      Right upper body: No supraclavicular adenopathy. Left upper body: No supraclavicular adenopathy. Skin:     General: Skin is warm and dry. Findings: No rash. Neurological:      General: No focal deficit present. Mental Status: She is alert and oriented to person, place, and time. Mental status is at baseline. Psychiatric:         Mood and Affect: Mood normal.         Behavior: Behavior normal.         Thought Content:  Thought content normal. Judgment: Judgment normal.         Cardiographics  ECG: last done 4/26/21, normal sinus rhythm  Echocardiogram: not done    Lab Review   Office Visit on 11/28/2022   Component Date Value    Dermatology Pathology Re* 11/28/2022 SEE COMMENTS (A)           Assessment:     57 y.o. y.o. female with planned surgery as above. 1. Preop examination  - medically cleared for surgery without further testing    2. Erosion of suburethral sling, sequela  - she wishes to proceed with the procedure to remove sling due to significant dyspareunia. 3. Heart palpitations- pvc's on ekg  - no known CVD. She elects to take asa 81 mg daily, but will stop it 7 days prior to surgery. Cardiac Risk Estimation: per the Revised Cardiac Risk Index (Circ. 100:1043, 1999), the patient's risk factors for cardiaccomplications include none, putting him/her in: RCI RISK CLASS I (0 risk factors, risk of major cardiac compl. appr. 0.5%)     : Ok to proceed with the procedure. Low cardiac risk. Pt.advised to stop all Rx the a.m. of surgery. Patient advised to hold blood thinning medication 7 days prior to procedure, including OTC NSAIDS/aspirin and vitamins. Prophylaxis for cardiac events with perioperative beta-blockers: not indicated    Deep vein thrombosis prophylaxis postoperatively: regimen to be chosen by surgical team if applicable. Katie Ford M.D. [address]  [address] [facility] [address]  Phone [phone]  Fax      [phone]

## 2022-12-22 NOTE — PROGRESS NOTES
[address] time__0755__________        Surgery time______0955______    Take the following medications with a sip of water: Follow your MD/Surgeons pre-procedure instructions regarding your medications     Do not eat or drink anything after 12:00 midnight prior to your surgery. This includes water chewing gum, mints and ice chips. You may brush your teeth and gargle the morning of your surgery, but do not swallow the water     Please see your family doctor/pediatrician for a history and physical and/or concerning medications. Bring any test results/reports from your physicians office. If you are under the care of a heart doctor or specialist doctor, please be aware that you may be asked to them for clearance    You may be asked to stop blood thinners such as Coumadin, Plavix, Fragmin, Lovenox, etc., or any anti-inflammatories such as:  Aspirin, Ibuprofen, Advil, Naproxen prior to your surgery. MAY TAKE TYLENOL   We also ask that you stop any OTC medications such as fish oil, vitamin E, glucosamine, garlic, Multivitamins, COQ 10, etc.    We ask that you do not smoke 24 hours prior to surgery  We ask that you do not  drink any alcoholic beverages 24 hours prior to surgery     You must make arrangements for a responsible adult to take you home after your surgery. For your safety you will not be allowed to leave alone or drive yourself home. Your surgery will be cancelled if you do not have a ride home. Also for your safety, it is strongly suggested that someone stay with you the first 24 hours after your surgery. A parent or legal guardian must accompany a child scheduled for surgery and plan to stay at the hospital until the child is discharged. Please do not bring other children with you. For your comfort, please wear simple loose fitting clothing to the hospital.  Please do not bring valuables.     Do not wear any make-up or nail polish on your fingers or toes      For your safety, please do not wear any jewelry or body piercing's on the day of surgery. All jewelry must be removed. If you have dentures, they will be removed before going to operating room. For your convenience, we will provide you with a container. If you wear contact lenses or glasses, they will be removed, please bring a case for them. If you have a living will and a durable power of  for healthcare, please bring in a copy. As part of our patient safety program to minimize surgical site infections, we ask you to do the following:    Please notify your surgeon if you develop any illness between         now and the  day of your surgery. This includes a cough, cold, fever, sore throat, nausea,         or vomiting, and diarrhea, etc.   Please notify your surgeon if you experience dizziness, shortness         of breath or blurred vision between now and the time of your surgery. Do not shave your operative site 96 hours prior to surgery. For face and neck surgery, men may use an electric razor 48 hours   prior to surgery. You may shower the night before surgery or the morning of   your surgery with an antibacterial soap. You will need to bring a photo ID and insurance card    [facility] has an onsite pharmacy, would you like to utilize our pharmacy     If you will be staying overnight and use a C-pap machine, please bring   your C-pap to hospital     Our goal is to provide you with excellent care, therefore, visitors will be limited to two(2) in the room at a time so that we may focus on providing this care for you. Please contact pre-admission testing if you have any further questions.                  [facility] phone number:  [phone] PAT fax number:  [phone]  Please note these are generalized instructions for all surgical cases, you may be provided with more specific instructions according to your surgery. C-Difficile admission screening and protocol:       * Admitted with diarrhea? [] YES    [x]  NO     *Prior history of C-Diff. In last 3 months? [] YES    [x]  NO     *Antibiotic use in the past 6-8 weeks? [x]  NO    []  YES                 If yes, which ANTIBIOTIC AND REASON______     *Prior hospitalization or nursing home in the last month? []  YES    [x]  NO        SAFETY FIRST. .call before you fall

## 2023-01-03 ENCOUNTER — ANESTHESIA EVENT (OUTPATIENT)
Dept: OPERATING ROOM | Age: 63
End: 2023-01-03
Payer: COMMERCIAL

## 2023-01-04 ENCOUNTER — HOSPITAL ENCOUNTER (OUTPATIENT)
Age: 63
Setting detail: OUTPATIENT SURGERY
Discharge: HOME | End: 2023-01-04
Attending: OBSTETRICS & GYNECOLOGY | Admitting: OBSTETRICS & GYNECOLOGY
Payer: COMMERCIAL

## 2023-01-04 ENCOUNTER — ANESTHESIA (OUTPATIENT)
Dept: OPERATING ROOM | Age: 63
End: 2023-01-04
Payer: COMMERCIAL

## 2023-01-04 VITALS
DIASTOLIC BLOOD PRESSURE: 70 MMHG | SYSTOLIC BLOOD PRESSURE: 113 MMHG | HEIGHT: 65 IN | WEIGHT: 162 LBS | OXYGEN SATURATION: 98 % | TEMPERATURE: 97.1 F | RESPIRATION RATE: 16 BRPM | BODY MASS INDEX: 26.99 KG/M2 | HEART RATE: 56 BPM

## 2023-01-04 DIAGNOSIS — T83.711A EROSION OF IMPLANTED VAGINAL MESH TO SURROUNDING TISSUE, INITIAL ENCOUNTER (HCC): ICD-10-CM

## 2023-01-04 PROCEDURE — 3600000002 HC SURGERY LEVEL 2 BASE: Performed by: OBSTETRICS & GYNECOLOGY

## 2023-01-04 PROCEDURE — 3600000012 HC SURGERY LEVEL 2 ADDTL 15MIN: Performed by: OBSTETRICS & GYNECOLOGY

## 2023-01-04 PROCEDURE — 2580000003 HC RX 258: Performed by: OBSTETRICS & GYNECOLOGY

## 2023-01-04 PROCEDURE — 2580000003 HC RX 258: Performed by: ANESTHESIOLOGY

## 2023-01-04 PROCEDURE — 7100000010 HC PHASE II RECOVERY - FIRST 15 MIN: Performed by: OBSTETRICS & GYNECOLOGY

## 2023-01-04 PROCEDURE — 6360000002 HC RX W HCPCS: Performed by: NURSE ANESTHETIST, CERTIFIED REGISTERED

## 2023-01-04 PROCEDURE — 7100000000 HC PACU RECOVERY - FIRST 15 MIN: Performed by: OBSTETRICS & GYNECOLOGY

## 2023-01-04 PROCEDURE — 7100000011 HC PHASE II RECOVERY - ADDTL 15 MIN: Performed by: OBSTETRICS & GYNECOLOGY

## 2023-01-04 PROCEDURE — 88300 SURGICAL PATH GROSS: CPT

## 2023-01-04 PROCEDURE — 2709999900 HC NON-CHARGEABLE SUPPLY: Performed by: OBSTETRICS & GYNECOLOGY

## 2023-01-04 PROCEDURE — 7100000001 HC PACU RECOVERY - ADDTL 15 MIN: Performed by: OBSTETRICS & GYNECOLOGY

## 2023-01-04 PROCEDURE — 2500000003 HC RX 250 WO HCPCS: Performed by: NURSE ANESTHETIST, CERTIFIED REGISTERED

## 2023-01-04 PROCEDURE — 3700000000 HC ANESTHESIA ATTENDED CARE: Performed by: OBSTETRICS & GYNECOLOGY

## 2023-01-04 PROCEDURE — 2500000003 HC RX 250 WO HCPCS: Performed by: OBSTETRICS & GYNECOLOGY

## 2023-01-04 PROCEDURE — 3700000001 HC ADD 15 MINUTES (ANESTHESIA): Performed by: OBSTETRICS & GYNECOLOGY

## 2023-01-04 PROCEDURE — A4217 STERILE WATER/SALINE, 500 ML: HCPCS | Performed by: OBSTETRICS & GYNECOLOGY

## 2023-01-04 RX ORDER — SODIUM CHLORIDE 9 MG/ML
INJECTION, SOLUTION INTRAVENOUS PRN
Status: DISCONTINUED | OUTPATIENT
Start: 2023-01-04 | End: 2023-01-04 | Stop reason: HOSPADM

## 2023-01-04 RX ORDER — KETOROLAC TROMETHAMINE 30 MG/ML
INJECTION, SOLUTION INTRAMUSCULAR; INTRAVENOUS PRN
Status: DISCONTINUED | OUTPATIENT
Start: 2023-01-04 | End: 2023-01-04 | Stop reason: SDUPTHER

## 2023-01-04 RX ORDER — SULFAMETHOXAZOLE AND TRIMETHOPRIM 800; 160 MG/1; MG/1
1 TABLET ORAL 2 TIMES DAILY
Qty: 10 TABLET | Refills: 0 | Status: SHIPPED | OUTPATIENT
Start: 2023-01-04 | End: 2023-01-09

## 2023-01-04 RX ORDER — CEFAZOLIN SODIUM 1 G/3ML
INJECTION, POWDER, FOR SOLUTION INTRAMUSCULAR; INTRAVENOUS PRN
Status: DISCONTINUED | OUTPATIENT
Start: 2023-01-04 | End: 2023-01-04 | Stop reason: SDUPTHER

## 2023-01-04 RX ORDER — MEPERIDINE HYDROCHLORIDE 25 MG/ML
12.5 INJECTION INTRAMUSCULAR; INTRAVENOUS; SUBCUTANEOUS EVERY 5 MIN PRN
Status: DISCONTINUED | OUTPATIENT
Start: 2023-01-04 | End: 2023-01-04 | Stop reason: HOSPADM

## 2023-01-04 RX ORDER — SODIUM CHLORIDE 0.9 % (FLUSH) 0.9 %
5-40 SYRINGE (ML) INJECTION EVERY 12 HOURS SCHEDULED
Status: DISCONTINUED | OUTPATIENT
Start: 2023-01-04 | End: 2023-01-04 | Stop reason: SDUPTHER

## 2023-01-04 RX ORDER — PROPOFOL 10 MG/ML
INJECTION, EMULSION INTRAVENOUS PRN
Status: DISCONTINUED | OUTPATIENT
Start: 2023-01-04 | End: 2023-01-04 | Stop reason: SDUPTHER

## 2023-01-04 RX ORDER — ONDANSETRON 2 MG/ML
4 INJECTION INTRAMUSCULAR; INTRAVENOUS
Status: DISCONTINUED | OUTPATIENT
Start: 2023-01-04 | End: 2023-01-04 | Stop reason: HOSPADM

## 2023-01-04 RX ORDER — DEXAMETHASONE SODIUM PHOSPHATE 10 MG/ML
INJECTION, SOLUTION INTRAMUSCULAR; INTRAVENOUS PRN
Status: DISCONTINUED | OUTPATIENT
Start: 2023-01-04 | End: 2023-01-04 | Stop reason: SDUPTHER

## 2023-01-04 RX ORDER — LIDOCAINE HYDROCHLORIDE AND EPINEPHRINE 10; 10 MG/ML; UG/ML
INJECTION, SOLUTION INFILTRATION; PERINEURAL
Status: COMPLETED | OUTPATIENT
Start: 2023-01-04 | End: 2023-01-04

## 2023-01-04 RX ORDER — ONDANSETRON 2 MG/ML
INJECTION INTRAMUSCULAR; INTRAVENOUS PRN
Status: DISCONTINUED | OUTPATIENT
Start: 2023-01-04 | End: 2023-01-04 | Stop reason: SDUPTHER

## 2023-01-04 RX ORDER — MAGNESIUM HYDROXIDE 1200 MG/15ML
LIQUID ORAL CONTINUOUS PRN
Status: DISCONTINUED | OUTPATIENT
Start: 2023-01-04 | End: 2023-01-04 | Stop reason: HOSPADM

## 2023-01-04 RX ORDER — LIDOCAINE HYDROCHLORIDE 20 MG/ML
INJECTION, SOLUTION EPIDURAL; INFILTRATION; INTRACAUDAL; PERINEURAL PRN
Status: DISCONTINUED | OUTPATIENT
Start: 2023-01-04 | End: 2023-01-04 | Stop reason: SDUPTHER

## 2023-01-04 RX ORDER — MIDAZOLAM HYDROCHLORIDE 1 MG/ML
INJECTION INTRAMUSCULAR; INTRAVENOUS PRN
Status: DISCONTINUED | OUTPATIENT
Start: 2023-01-04 | End: 2023-01-04 | Stop reason: SDUPTHER

## 2023-01-04 RX ORDER — SODIUM CHLORIDE 0.9 % (FLUSH) 0.9 %
5-40 SYRINGE (ML) INJECTION PRN
Status: DISCONTINUED | OUTPATIENT
Start: 2023-01-04 | End: 2023-01-04 | Stop reason: HOSPADM

## 2023-01-04 RX ORDER — SODIUM CHLORIDE 0.9 % (FLUSH) 0.9 %
5-40 SYRINGE (ML) INJECTION PRN
Status: DISCONTINUED | OUTPATIENT
Start: 2023-01-04 | End: 2023-01-04 | Stop reason: SDUPTHER

## 2023-01-04 RX ORDER — FENTANYL CITRATE 50 UG/ML
25 INJECTION, SOLUTION INTRAMUSCULAR; INTRAVENOUS EVERY 5 MIN PRN
Status: DISCONTINUED | OUTPATIENT
Start: 2023-01-04 | End: 2023-01-04 | Stop reason: HOSPADM

## 2023-01-04 RX ORDER — SODIUM CHLORIDE 9 MG/ML
INJECTION, SOLUTION INTRAVENOUS PRN
Status: DISCONTINUED | OUTPATIENT
Start: 2023-01-04 | End: 2023-01-04 | Stop reason: SDUPTHER

## 2023-01-04 RX ORDER — SODIUM CHLORIDE 0.9 % (FLUSH) 0.9 %
5-40 SYRINGE (ML) INJECTION EVERY 12 HOURS SCHEDULED
Status: DISCONTINUED | OUTPATIENT
Start: 2023-01-04 | End: 2023-01-04 | Stop reason: HOSPADM

## 2023-01-04 RX ORDER — FENTANYL CITRATE 50 UG/ML
INJECTION, SOLUTION INTRAMUSCULAR; INTRAVENOUS PRN
Status: DISCONTINUED | OUTPATIENT
Start: 2023-01-04 | End: 2023-01-04 | Stop reason: SDUPTHER

## 2023-01-04 RX ORDER — FENTANYL CITRATE 50 UG/ML
50 INJECTION, SOLUTION INTRAMUSCULAR; INTRAVENOUS EVERY 5 MIN PRN
Status: DISCONTINUED | OUTPATIENT
Start: 2023-01-04 | End: 2023-01-04 | Stop reason: HOSPADM

## 2023-01-04 RX ADMIN — PROPOFOL 100 MG: 10 INJECTION, EMULSION INTRAVENOUS at 07:35

## 2023-01-04 RX ADMIN — MIDAZOLAM 2 MG: 1 INJECTION INTRAMUSCULAR; INTRAVENOUS at 07:29

## 2023-01-04 RX ADMIN — KETOROLAC TROMETHAMINE 15 MG: 30 INJECTION, SOLUTION INTRAMUSCULAR; INTRAVENOUS at 08:00

## 2023-01-04 RX ADMIN — PROPOFOL 150 MCG/KG/MIN: 10 INJECTION, EMULSION INTRAVENOUS at 07:36

## 2023-01-04 RX ADMIN — FENTANYL CITRATE 25 MCG: 50 INJECTION INTRAMUSCULAR; INTRAVENOUS at 07:43

## 2023-01-04 RX ADMIN — CEFAZOLIN SODIUM 2 G: 1 INJECTION, POWDER, FOR SOLUTION INTRAMUSCULAR; INTRAVENOUS at 07:39

## 2023-01-04 RX ADMIN — FENTANYL CITRATE 25 MCG: 50 INJECTION INTRAMUSCULAR; INTRAVENOUS at 07:40

## 2023-01-04 RX ADMIN — DEXAMETHASONE SODIUM PHOSPHATE 10 MG: 10 INJECTION, SOLUTION INTRAMUSCULAR; INTRAVENOUS at 07:40

## 2023-01-04 RX ADMIN — ONDANSETRON 4 MG: 2 INJECTION INTRAMUSCULAR; INTRAVENOUS at 07:40

## 2023-01-04 RX ADMIN — LIDOCAINE HYDROCHLORIDE 100 MG: 20 INJECTION, SOLUTION EPIDURAL; INFILTRATION; INTRACAUDAL; PERINEURAL at 07:35

## 2023-01-04 RX ADMIN — FENTANYL CITRATE 25 MCG: 50 INJECTION INTRAMUSCULAR; INTRAVENOUS at 07:54

## 2023-01-04 RX ADMIN — SODIUM CHLORIDE: 9 INJECTION, SOLUTION INTRAVENOUS at 07:02

## 2023-01-04 RX ADMIN — FENTANYL CITRATE 25 MCG: 50 INJECTION INTRAMUSCULAR; INTRAVENOUS at 07:50

## 2023-01-04 ASSESSMENT — PAIN SCALES - GENERAL: PAINLEVEL_OUTOF10: 0

## 2023-01-04 ASSESSMENT — PAIN - FUNCTIONAL ASSESSMENT: PAIN_FUNCTIONAL_ASSESSMENT: NONE - DENIES PAIN

## 2023-01-04 ASSESSMENT — LIFESTYLE VARIABLES: SMOKING_STATUS: 0

## 2023-01-04 NOTE — PROGRESS NOTES
Pt awake and alert. VSS on room air. Pt denies pain. Last /67. Ok per Dr. Maya Crowley for pt to move to phase 2.

## 2023-01-04 NOTE — PROGRESS NOTES
Patient tolerated snack well. Denies pain, VSS. Discharge instructions discussed with patient and , both voiced understanding. Patient ready for discharge.

## 2023-01-04 NOTE — PROGRESS NOTES
Patient received from PACU alert, awake, VSS. Patient denies pain. Patient with call light in reach and snack provided.

## 2023-01-04 NOTE — PROGRESS NOTES
Patient tolerated snack well. Discharge instructions discussed with patient and . Patient ready for discharge.

## 2023-01-04 NOTE — PROGRESS NOTES
Vaginal Sweep Documentation     Vaginal prep sponge count performed by [facility] RN and JAYME BROWN. Count correct.

## 2023-01-04 NOTE — ANESTHESIA PRE PROCEDURE
Department of Anesthesiology  Preprocedure Note       Name:  Lior Valladares   Age:  57 y.o.  :  1960                                          MRN:  [id number]         Date:  2023      Surgeon: Rayray Edward):  Rei Lizama MD    Procedure: Procedure(s):  VAGINAL EXAM UNDER ANESTHESIA  REVISION INCLUDING REMOVAL OF MESH    Medications prior to admission:   Prior to Admission medications    Medication Sig Start Date End Date Taking? Authorizing Provider   EPINEPHrine (EPIPEN 2-GOWDIN) 0.3 MG/0.3ML SOAJ injection INJECT 0.3 INTO THE SKIN ONCE FOR UP TO ONE DOSE AS DIRECTED FOR ALLERGIC REACTION 21   Judge Jeronimo MD   Multiple Vitamin (MULTIVITAMIN PO) Take 1 tablet by mouth nightly     Historical Provider, MD   aspirin 81 MG EC tablet Take 81 mg by mouth nightly     Historical Provider, MD       Current medications:    No current facility-administered medications for this visit. No current outpatient medications on file. Facility-Administered Medications Ordered in Other Visits   Medication Dose Route Frequency Provider Last Rate Last Admin    sodium chloride flush 0.9 % injection 5-40 mL  5-40 mL IntraVENous 2 times per day Jessica Hernandes MD        sodium chloride flush 0.9 % injection 5-40 mL  5-40 mL IntraVENous PRN Jessica Hernandes MD        0.9 % sodium chloride infusion   IntraVENous PRN Jessica Hernandes MD        sodium chloride flush 0.9 % injection 5-40 mL  5-40 mL IntraVENous 2 times per day Rei Lizama MD        sodium chloride flush 0.9 % injection 5-40 mL  5-40 mL IntraVENous PRN Rei Lizama MD        0.9 % sodium chloride infusion   IntraVENous PRN Rei Lizama MD           Allergies:     Allergies   Allergen Reactions    Amoxicillin Hives and Itching    Bee Venom Anaphylaxis    Cefuroxime Axetil Hives and Itching    Penicillins Hives and Itching     All pcn related drugs    Ampicillin Hives       Problem List:    Patient Active Problem List   Diagnosis Code    Vitamin D deficiency E55.9    Bee sting allergy Z91.030    Heart palpitations- pvc's on ekg R00.2    Primary osteoarthritis of right knee M17.11    H/O colonoscopy with polypectomy 9/6/18, Dr Amaury Hermosillo, 1 polyp (2mm) Z98.890, Z86.010    LEVON (stress urinary incontinence, female) N39.3       Past Medical History:        Diagnosis Date    Bee sting allergy     Hyperlipidemia     h/o not current    Obesity     Prolonged emergence from general anesthesia     Stress incontinence        Past Surgical History:        Procedure Laterality Date    BLEPHAROPLASTY Bilateral 2014    Dr Lucero Ruayush  09/06/2018    dr Maureen Stiener Left 2008    hammer toe repair; DR Liang Flower LASIK Bilateral     Dr Aditya Donohue GASTROPLASTY  12/7/10    Laparoscopic sleeve gastrectomy (Dr Jose Killian)   [facility] N/A 9/10/2021    CYSTOSCOPY, TRANS VAGINAL TAPING OBTERATOR performed by Atul Lundy MD at [facility] 1992       Social History:    Social History     Tobacco Use    Smoking status: Never    Smokeless tobacco: Never   Substance Use Topics    Alcohol use: Yes     Comment: on weekends, 2-3 drinks                                Counseling given: Not Answered      Vital Signs (Current): There were no vitals filed for this visit.                                            BP Readings from Last 3 Encounters:   12/12/22 108/60   11/09/22 115/85   09/10/21 (!) 105/58       NPO Status:                                                                                 BMI:   Wt Readings from Last 3 Encounters:   12/22/22 162 lb (73.5 kg)   12/12/22 164 lb 12.8 oz (74.8 kg)   09/10/21 171 lb 15.3 oz (78 kg)     There is no height or weight on file to calculate BMI.    CBC:   Lab Results   Component Value Date/Time    WBC 5.4 08/10/2018 11:42 AM    RBC 4.31 08/10/2018 11:42 AM    HGB 13.7 08/10/2018 11:42 AM    HCT 41.1 08/10/2018 11:42 AM    MCV 95.4 08/10/2018 11:42 AM    RDW 13.1 08/10/2018 11:42 AM     08/10/2018 11:42 AM       CMP:   Lab Results   Component Value Date/Time     08/10/2018 11:42 AM    K 4.1 08/10/2018 11:42 AM     08/10/2018 11:42 AM    CO2 25 08/10/2018 11:42 AM    BUN 11 08/10/2018 11:42 AM    CREATININE 0.6 08/10/2018 11:42 AM    GFRAA >60 08/10/2018 11:42 AM    GFRAA >60 05/08/2012 06:11 PM    AGRATIO 1.7 08/10/2018 11:42 AM    LABGLOM >60 08/10/2018 11:42 AM    GLUCOSE 86 08/29/2022 09:37 AM    PROT 7.3 08/10/2018 11:42 AM    PROT 7.1 05/08/2012 06:11 PM    CALCIUM 9.4 08/10/2018 11:42 AM    BILITOT 0.5 08/10/2018 11:42 AM    ALKPHOS 117 08/10/2018 11:42 AM    AST 20 08/10/2018 11:42 AM    ALT 15 08/10/2018 11:42 AM       POC Tests: No results for input(s): POCGLU, POCNA, POCK, POCCL, POCBUN, POCHEMO, POCHCT in the last 72 hours. Coags: No results found for: PROTIME, INR, APTT    HCG (If Applicable):   Lab Results   Component Value Date    PREGTESTUR Neg 12/07/2010        ABGs: No results found for: PHART, PO2ART, LXC5SKC, PRO8GSO, BEART, I4NNPIQN     Type & Screen (If Applicable):  Lab Results   Component Value Date    LABABO A 11/30/2010    [address] Positive 11/30/2010       Drug/Infectious Status (If Applicable):  No results found for: HIV, HEPCAB    COVID-19 Screening (If Applicable):   Lab Results   Component Value Date/Time    COVID19 Not Detected 04/21/2020 05:23 PM           Anesthesia Evaluation     History of anesthetic complications: h/o prolonged emergence. Airway: Mallampati: III  TM distance: >3 FB   Neck ROM: full  Mouth opening: > = 3 FB   Dental: normal exam         Pulmonary:Negative Pulmonary ROS and normal exam        (-) COPD, asthma, sleep apnea, rhonchi, wheezes, rales and not a current smoker                           Cardiovascular:  Exercise tolerance: good (>4 METS),       (-) hypertension,  SPARKS and no hyperlipidemiaDysrhythmias: PVC's.       Rhythm: regular  Rate: normal                 ROS comment: Patient wants to know when she can return to [employer] classes following procedure     Neuro/Psych:   Negative Neuro/Psych ROS              GI/Hepatic/Renal:   (+) GERD (diet controlled): well controlled,      Morbid obesity: s/p gastric sleeve. Endo/Other:    (+) : arthritis:., .    (-) diabetes mellitus, hypothyroidism, hyperthyroidism               Abdominal:         (-) obese Abdomen: soft. Vascular: negative vascular ROS. Other Findings:             Anesthesia Plan      general     ASA 2       Induction: intravenous. MIPS: Postoperative opioids intended and Prophylactic antiemetics administered. Anesthetic plan and risks discussed with patient. Use of blood products discussed with patient whom consented to blood products. Plan discussed with CRNA. This pre-anesthesia assessment may be used as a history and physical.    DOS STAFF ADDENDUM:    Pt seen and examined, chart reviewed (including anesthesia, drug and allergy history). No interval changes to history and physical examination. Anesthetic plan, risks, benefits, alternatives, and personnel involved discussed with patient. Patient verbalized an understanding and agrees to proceed.       Ari Foreman MD  January 4, 2023  6:49 AM

## 2023-01-04 NOTE — BRIEF OP NOTE
Brief Postoperative Note      Patient: Jewel Almonte  YOB: 1960  MRN: [id number]    Date of Procedure: 1/4/2023    Pre-Op Diagnosis: MESH EROSION    Post-Op Diagnosis:  same and left groin inflamtion       Procedure(s):  VAGINAL EXAM UNDER ANESTHESIA, REVISION INCLUDING REMOVAL OF MESH    Surgeon(s):  Majnu Kunz MD    Assistant:  Surgical Assistant: Carolina Juan    Anesthesia: Monitor Anesthesia Care    Estimated Blood Loss (mL): Minimal    Complications: None    Specimens:   ID Type Source Tests Collected by Time Destination   A : A) SLING FOR GROSS ANALYSIS Genital Vaginal SURGICAL PATHOLOGY Manju Kunz MD 1/4/2023 1640        Implants:  * No implants in log *      Drains: * No LDAs found *    Findings: erosion of TOT in right vaginal sulcus under urethra that was easily removed. Her left groin at the area of the TOT entrance/exit was raised and looked infected, it was opened but there was no drainage and I could not feel any foreign body below.     Electronically signed by Viji Pearson MD on 1/4/2023 at 8:01 AM

## 2023-01-04 NOTE — ANESTHESIA POSTPROCEDURE EVALUATION
Department of Anesthesiology  Postprocedure Note    Patient: Saud Butler  MRN: [id number]  YOB: 1960  Date of evaluation: 1/4/2023      Procedure Summary     Date: 01/04/23 Room / Location: [address]    Anesthesia Start: 0730 Anesthesia Stop: 7126    Procedure: VAGINAL EXAM UNDER ANESTHESIA, REVISION INCLUDING REMOVAL OF MESH, INCISION AND DRAINAGE OF LEFT GROIN (Vagina ) Diagnosis:       Erosion of implanted vaginal mesh to surrounding tissue, initial encounter [facility])      (MESH EROSION)    Surgeons: Lindsey English MD Responsible Provider: Briana Da Silva MD    Anesthesia Type: general ASA Status: 2          Anesthesia Type: No value filed.     Eladio Phase I: Eladio Score: 10    Eladio Phase II: Eladio Score: 10      Anesthesia Post Evaluation    Patient location during evaluation: PACU  Patient participation: complete - patient participated  Level of consciousness: awake and alert  Pain score: 0  Airway patency: patent  Nausea & Vomiting: no nausea and no vomiting  Complications: no  Cardiovascular status: blood pressure returned to baseline  Respiratory status: acceptable  Hydration status: euvolemic

## 2023-01-04 NOTE — OP NOTE
Operative Note      Patient: Jenni Mishra  YOB: 1960  MRN: [id number]    Date of Procedure: 1/4/2023    Pre-Op Diagnosis: MESH EROSION    Post-Op Diagnosis: Same       Procedures: Procedure(s):  VAGINAL EXAM UNDER ANESTHESIA, REVISION INCLUDING REMOVAL OF MESH, INCISION AND DRAINAGE OF LEFT GROIN     Surgeon: Surgeon(s):  Daisha Chappell MD    Anesthesia: Monitor Anesthesia Care    Assistant: Surgical Assistant: Megan Flynn    Estimated Blood Loss (mL): minimal    IV FLUIDS: 1000 milliliter(s) In: 1000 [I.V.:1000]  Out: 100 [Urine:100]    URINE OUTPUT: NA milliters(s)   Output by Drain (mL) 01/02/23 0701 - 01/02/23 1900 01/02/23 1901 - 01/03/23 0700 01/03/23 0701 - 01/03/23 1900 01/03/23 1901 - 01/04/23 0700 01/04/23 0701 - 01/04/23 6730   Patient has no LDAs of requested type attached. Complications: None    Specimens:   ID Type Source Tests Collected by Time Destination   A : A) SLING FOR GROSS ANALYSIS Genital Vaginal SURGICAL PATHOLOGY Daisha Chappell MD 1/4/2023 0314        Implants: * No implants in log *      Drains: * No LDAs found *    FINDINGS: Revision of transobturator mesh in the right vaginal sulcus easily removed, left groin inflammation that was incised    INDICATION FOR PROCEDURE: 57y.o. year old patient who presented with vaginal mesh erosion. She underwent a transobturator tape approximately 1 year ago and developed vaginal bleeding and spotting and was found on examination to have extrusion of the mesh sling. Today on the day of the surgery her left groin appeared to have a reaction to a foreign body or an infection. It was in the area of where the sling would have exited from her thigh. We decided to incise that drain it and if the sling was close to the skin we were going to cut it.   OPERATIVE NOTE: Patient was taken to the operating room where anesthesia was obtained without difficulty she was then prepped and draped normal sterile fashion in a dorsal supine lithotomy position. Initially on examination the mesh was easily identified in the right vaginal sulcus. This was cut in the midline and also cut laterally. Palpation revealed no further mesh that was exposed. Attention was then turned to the left groin which was incised with a knife. Initially just fatty material came out. There was no foul odor and there was no purulent material that could be identified. I placed the tip of my finger in the incision and felt around for any type of foreign body and could not find anything. I then irrigated this well and left it open for drainage.   Patient tolerated the procedure well sponge lap and needle counts were correct      Electronically signed by Mindi Buitrago MD on 1/4/2023 at 8:41 AM

## 2023-01-04 NOTE — H&P
Date of Surgery Update:  Jenni Mishra was seen, history and physical examination reviewed, and patient examined by me today. There have been no significant clinical changes since the completion of the previous history and physical. The surgical site was confirmed by the patient and me. I have presented reasonable alternatives to the patient's proposed care, treatment, and services. The discussion I have done encompassed risks, benefits, and side effects related to the alternatives and the risks related to not receiving the proposed care, treatment, and services. All questions answered. Patient wishes to proceed.      Electronically signed by: Hope Galeazzi, MD,1/4/2023,7:17 AM

## 2023-01-04 NOTE — DISCHARGE INSTRUCTIONS
[facility]  [address] [facility] [address]   [phone]    Dr. Dario Grier MD  [address]  [address] [facility] [place] [address] [place]  Phone [phone]  Fax: [phone]      PATIENT NAME: Jackie Sharma  MEDICAL RECORD NUMBER:  [id number]  TODAY'S DATE: 1/4/2023       Discharge Instructions Minor: Procedure(s):  VAGINAL EXAM UNDER ANESTHESIA, REVISION INCLUDING REMOVAL OF MESH, INCISION AND DRAINAGE OF LEFT GROIN: 01402 (CPT®)      Post-operative instructions  WHAT TO EXPECT AFTER THE PROCEDURE:   Diet You may return to your normal diet after surgery. Mild nausea and possibly vomiting may occur in the first 6-8 hours following surgery. This is usually due to side effects of anesthesia and will resolve. We suggest clear liquids and a light meal the evening following surgery. Activity You will be limited to light activity for 2 weeks. You may not engage in sexual intercourse, use tampons, lift  greater than 10 lbs. , jump, squat, or ride straddle (bike, motorcycle, etc.) for 4-6 weeks. Voiding You may notice some mild burning with urination after surgery due to the catheter that is placed during surgery. This should resolve in 1-2 days. You also may notice a slower stream or mild difficulty voiding. This can be secondary to the swelling and usually improves within a week. If at any point you cannot void for 6 straight hours, contact our office. You may come home with a urinary catheter (should not be attached to a bag) you will come into the office 1 week later to attempt a voiding trial and see if its ready to be removed. Please contact our office if you have any of the following symptoms:  Discharge that has a foul odor or is green in color. Soaking a pad every 2 hours, continuing to bleed longer than 1 week or have a sudden increase in your bleeding. Develop a fever of 100.4 or higher.      Hygiene You may resume normal showering immediately after surgery. Avoid baths and swimming for 4 weeks after surgery. No lotions or creams on incisions, unless directed by provider    Medications In most cases, you will be sent home with a prescription pain medication. If the pain medication you are sent home with does not control the pain when being used as directed, call our office. Typically, discomfort lasts from a few days to two weeks, but it should progressively improve. While taking prescription pain medication, it is recommended you also take a stool softener such as Docusate Sodium (Colace, Dulcolax) to counteract the constipating side effects of the pain medication. If the pain is mild, you may take over-the-counter Tylenol (acetaminophen) or a Non-Steroidal Anti-Inflammatories (NSAIDs) such as Aspirin, Motrin or Advil (Ibuprofen), Aleve (Naproxen). Post-Op Visits You will be scheduled to see the Nurse Practitioner approximately 2 weeks after your surgery. FOLLOW UP APPOINTMENTS  Upon leaving the hospital, you should call [phone] during normal business hours to schedule follow-up appointments.  You will likely need to be seen for the following:  Catheter removal in one week, if you go home with a bladder catheter (OR you may be instructed to remove your catheter at home in one week)   Two-week follow-up   Six-week follow-up

## 2023-01-05 ENCOUNTER — TELEPHONE (OUTPATIENT)
Dept: UROGYNECOLOGY | Age: 63
End: 2023-01-05

## 2023-01-19 ENCOUNTER — OFFICE VISIT (OUTPATIENT)
Dept: UROGYNECOLOGY | Age: 63
End: 2023-01-19

## 2023-01-19 VITALS
SYSTOLIC BLOOD PRESSURE: 122 MMHG | TEMPERATURE: 98 F | DIASTOLIC BLOOD PRESSURE: 80 MMHG | HEART RATE: 78 BPM | OXYGEN SATURATION: 96 % | RESPIRATION RATE: 14 BRPM

## 2023-01-19 DIAGNOSIS — Z09 POSTOP CHECK: Primary | ICD-10-CM

## 2023-01-19 PROCEDURE — 99024 POSTOP FOLLOW-UP VISIT: CPT | Performed by: NURSE PRACTITIONER

## 2023-01-19 NOTE — PROGRESS NOTES
1/19/2023       HPI:     Name: Lesly Naranjo  YOB: 1960    CC: Lesly Naranjo is a 62 y.o. female who is here for a 2 week post op evaluation.  HPI: Recently underwent VAGINAL EXAM UNDER ANESTHESIA, REVISION INCLUDING REMOVAL OF MESH, INCISION AND DRAINAGE OF LEFT GROIN   Voiding difficulty: No  Initiating stream: No  Force stream: No  Lean forward to empty: No  Slow/intermittent stream: No  Unable to empty bladder: No  Incontinence: stress new since surgery  Urgency without incontinence: No   Frequency without incontinence: No   Bowel functions: normal   Pain Controlled: No    Past Medical History:   Past Medical History:   Diagnosis Date    Bee sting allergy     Hyperlipidemia     h/o not current    Obesity     Prolonged emergence from general anesthesia     Stress incontinence      Past Surgical History:   Past Surgical History:   Procedure Laterality Date    BLEPHAROPLASTY Bilateral 2014    Dr Keila Villa    COLONOSCOPY  09/06/2018    dr case    FOOT SURGERY Left 2008    hammer toe repair; DR Freeman    LASIK Bilateral     Dr Simms    SLEEVE GASTROPLASTY  12/7/10    Laparoscopic sleeve gastrectomy (Dr Pedraza)    TUBAL LIGATION  1990    URETHRAL SURGERY N/A 9/10/2021    CYSTOSCOPY, TRANS VAGINAL TAPING OBTERATOR performed by Raul Reed MD at [facility] OR    WRIST SURGERY Left 1992     Current Medications:  Current Outpatient Medications   Medication Sig Dispense Refill    Multiple Vitamin (MULTIVITAMIN PO) Take 1 tablet by mouth nightly       aspirin 81 MG EC tablet Take 81 mg by mouth nightly       EPINEPHrine (EPIPEN 2-GODWIN) 0.3 MG/0.3ML SOAJ injection INJECT 0.3 INTO THE SKIN ONCE FOR UP TO ONE DOSE AS DIRECTED FOR ALLERGIC REACTION (Patient not taking: Reported on 1/19/2023) 2 each 0     No current facility-administered medications for this visit.     Allergies:   Allergies   Allergen Reactions    Amoxicillin Hives and Itching    Bee Venom Anaphylaxis    Cefuroxime Axetil Hives and  Itching    Penicillins Hives and Itching     All pcn related drugs    Ampicillin Hives     Social History:   Social History     Socioeconomic History    Marital status:      Spouse name: Jen Espino    Number of children: 0    Years of education: Not on file    Highest education level: Not on file   Occupational History    Occupation: RN     Employer: [employer]   Tobacco Use    Smoking status: Never    Smokeless tobacco: Never   Vaping Use    Vaping Use: Never used   Substance and Sexual Activity    Alcohol use: Yes     Comment: on weekends, 2-3 drinks    Drug use: Never    Sexual activity: Yes     Partners: Male     Comment:  Jen Espino   Other Topics Concern    Not on file   Social History Narrative    Lives with , 1 dog     Social Determinants of Health     Financial Resource Strain: Low Risk     Difficulty of Paying Living Expenses: Not hard at all   Food Insecurity: No Food Insecurity    Worried About Running Out of Food in the Last Year: Never true    Ran Out of Food in the Last Year: Never true   Transportation Needs: Not on file   Physical Activity: Not on file   Stress: Not on file   Social Connections: Not on file   Intimate Partner Violence: Not on file   Housing Stability: Not on file     Family History:   Family History   Problem Relation Age of Onset    Cancer Father         skin         Objective:     Vitals  Vitals:    01/19/23 0904   BP: 122/80   Pulse: 78   Resp: 14   Temp: 98 °F (36.7 °C)   SpO2: 96%     Physical Exam  Physical Exam  Vitals and nursing note reviewed. Constitutional:       Appearance: Normal appearance. HENT:      Head: Normocephalic. Eyes:      Conjunctiva/sclera: Conjunctivae normal.   Cardiovascular:      Rate and Rhythm: Normal rate. Pulmonary:      Effort: Pulmonary effort is normal.   Musculoskeletal:         General: Normal range of motion. Cervical back: Normal range of motion. Skin:     General: Skin is warm and dry.    Neurological: General: No focal deficit present. Mental Status: She is alert. Psychiatric:         Mood and Affect: Mood normal.         Behavior: Behavior normal.     All surgical incisions healing well. No erythema. No evidence of hematoma or abscess. No results found for this visit on 01/19/23. Assessnent/Plan:     Feng Hernandez is a 57 y.o. female with   1. Postop check      Patient is doing well 2 weeks. Restrictions reviewed  Patient is to follow up in 4 weeks . Desires to see Dr. Evan Middleton to talk about sling. Luis Carlos Parikh, JANINA - CNP       No orders of the defined types were placed in this encounter. No orders of the defined types were placed in this encounter.       Luis Carlos Parikh, APRN - CNP

## 2023-01-31 ENCOUNTER — TELEPHONE (OUTPATIENT)
Dept: FAMILY MEDICINE CLINIC | Age: 63
End: 2023-01-31

## 2023-01-31 NOTE — TELEPHONE ENCOUNTER
Pt had surgery on 1/4/2023 to remove a bladder sling   Pt has a incision on the left upper mass it was lanced seems like incision is getting bigger pt is concerned with infection she stated the mass is coming out of incision and it is bleeding   Pt went back for a follow up with the surgeon with Dr Palmer Barros     Per ma advised to contact surgeon office

## 2023-02-01 ENCOUNTER — OFFICE VISIT (OUTPATIENT)
Dept: UROGYNECOLOGY | Age: 63
End: 2023-02-01

## 2023-02-01 VITALS
SYSTOLIC BLOOD PRESSURE: 118 MMHG | OXYGEN SATURATION: 97 % | TEMPERATURE: 98.1 F | DIASTOLIC BLOOD PRESSURE: 84 MMHG | HEART RATE: 108 BPM | RESPIRATION RATE: 16 BRPM

## 2023-02-01 DIAGNOSIS — T85.9XXS: Primary | ICD-10-CM

## 2023-02-01 DIAGNOSIS — Z09 POSTOP CHECK: ICD-10-CM

## 2023-02-01 PROCEDURE — 99024 POSTOP FOLLOW-UP VISIT: CPT | Performed by: OBSTETRICS & GYNECOLOGY

## 2023-02-01 NOTE — PROGRESS NOTES
2/1/2023       HPI:     Name: Lacey Lorenzo  YOB: 1960    CC: Lacey Lorenzo is a 57 y.o. female who is here for a 4 week post op evaluation. HPI: Recently underwent REMOVAL OF MESH, INCISION AND DRAINAGE OF LEFT GROIN on 1/4/23. Voiding difficulty: No  Initiating stream: No  Force stream: No  Lean forward to empty: No  Slow/intermittent stream: No  Unable to empty bladder: No  Incontinence: Stress incontinence  Urgency without incontinence: No   Frequency without incontinence: No   Bowel functions: normal   Pain Controlled: No    Past Medical History:   Past Medical History:   Diagnosis Date    Bee sting allergy     Hyperlipidemia     h/o not current    Obesity     Prolonged emergence from general anesthesia     Stress incontinence      Past Surgical History:   Past Surgical History:   Procedure Laterality Date    BLEPHAROPLASTY Bilateral 2014    Dr Nicole Hairston    COLONOSCOPY  09/06/2018    dr Anitra Goff Left 2008    hammer toe repair; DR Dashawn Baker Bilateral     Dr Luz Rush GASTROPLASTY  12/7/10    Laparoscopic sleeve gastrectomy (Dr Alfredo Acuña)    [address] [facility] N/A 9/10/2021    CYSTOSCOPY, TRANS VAGINAL TAPING OBTERATOR performed by Kimberly Tucker MD at [address] [place] [address] Left 1992     Current Medications:  Current Outpatient Medications   Medication Sig Dispense Refill    EPINEPHrine (EPIPEN 2-GODWIN) 0.3 MG/0.3ML SOAJ injection INJECT 0.3 INTO THE SKIN ONCE FOR UP TO ONE DOSE AS DIRECTED FOR ALLERGIC REACTION 2 each 0    Multiple Vitamin (MULTIVITAMIN PO) Take 1 tablet by mouth nightly       aspirin 81 MG EC tablet Take 81 mg by mouth nightly        No current facility-administered medications for this visit. Allergies:    Allergies   Allergen Reactions    Amoxicillin Hives and Itching    Bee Venom Anaphylaxis    Cefuroxime Axetil Hives and Itching    Penicillins Hives and Itching     All pcn related drugs    Ampicillin Hives Social History:   Social History     Socioeconomic History    Marital status:      Spouse name: Steve Moe    Number of children: 0    Years of education: Not on file    Highest education level: Not on file   Occupational History    Occupation: RN     Employer: [employer]   Tobacco Use    Smoking status: Never    Smokeless tobacco: Never   Vaping Use    Vaping Use: Never used   Substance and Sexual Activity    Alcohol use: Yes     Comment: on weekends, 2-3 drinks    Drug use: Never    Sexual activity: Yes     Partners: Male     Comment:  Steve Moe   Other Topics Concern    Not on file   Social History Narrative    Lives with , 1 dog     Social Determinants of Health     Financial Resource Strain: Low Risk     Difficulty of Paying Living Expenses: Not hard at all   Food Insecurity: No Food Insecurity    Worried About Running Out of Food in the Last Year: Never true    Ran Out of Food in the Last Year: Never true   Transportation Needs: Not on file   Physical Activity: Not on file   Stress: Not on file   Social Connections: Not on file   Intimate Partner Violence: Not on file   Housing Stability: Not on file     Family History:   Family History   Problem Relation Age of Onset    Cancer Father         skin         Objective:     Vitals  Vitals:    02/01/23 1200   BP: 118/84   Pulse: (!) 108   Resp: 16   Temp: 98.1 °F (36.7 °C)   SpO2: 97%     Physical Exam  Physical Exam  In the left groin the area where the exit wound from the TOT continues to be irritated and draining. No results found for this visit on 02/01/23. Assessnent/Plan:     Aurelia Johansen is a 57 y.o. female with   1. Complication of implanted vaginal mesh, sequela    2. Postop check      During her initial surgery I made a small incision over the area however I was unable to feel the mesh and just drained it. She continues to have drainage and irritation in this area and I think the mesh needs to be removed.   I did speak with Dr. John Rahman of general surgery for further evaluation. Orders Placed This Encounter   Procedures    Feliz Kim MD, General Surgery, [facility]     Referral Priority:   Routine     Referral Type:   Eval and Treat     Referral Reason:   Specialty Services Required     Referred to Provider:   Ben Monroe MD     Requested Specialty:   General Surgery     Number of Visits Requested:   1       No orders of the defined types were placed in this encounter.       Rodri Fragoso MD

## 2023-02-02 ENCOUNTER — OFFICE VISIT (OUTPATIENT)
Dept: SURGERY | Age: 63
End: 2023-02-02
Payer: COMMERCIAL

## 2023-02-02 VITALS — WEIGHT: 162 LBS | BODY MASS INDEX: 26.96 KG/M2

## 2023-02-02 DIAGNOSIS — T83.711A EROSION OF IMPLANTED VAGINAL MESH TO SURROUNDING TISSUE, INITIAL ENCOUNTER (HCC): Primary | ICD-10-CM

## 2023-02-02 PROCEDURE — 99243 OFF/OP CNSLTJ NEW/EST LOW 30: CPT | Performed by: SURGERY

## 2023-02-02 RX ORDER — DOXYCYCLINE HYCLATE 100 MG
100 TABLET ORAL 2 TIMES DAILY
Qty: 14 TABLET | Refills: 0 | Status: SHIPPED | OUTPATIENT
Start: 2023-02-02 | End: 2023-02-02 | Stop reason: ALTCHOICE

## 2023-02-02 NOTE — LETTER
L/m informing pt of results   Surgeon: Sulema Hidalgo MD     Your surgery will be at [facility]  First floor of the [address] [place] [address]. Deanna Ugalde [address]    Date:2/10/23    Arrival time:8:25am    Surgery time: 9:40am    (   ) Outpatient with general anesthesia     ( x  ) Outpatient with IV sedation     (    ) Local anethesia    You will need to have a  drive you home due to anesthesia. Nothing to eat or drink after midnight the night before. FASTING SURGERY      You may return to work on 2/13/23, if you are still having pain come 2/13/23, please give our office a call with the numbers provided below. Please be sure to bring your I.D. and insurance card with you at the time of your surgery. I will call you if there are any changes to your surgery     Please contact [facility] if you need to reschedule your surgery or have any questions.   Office phone number:     [phone]  Fax number for [facility]:    [phone]

## 2023-02-02 NOTE — PROGRESS NOTES
[address] [facility] time ____0825________        Surgery time ___0950_________    Take the following medications with a sip of water: Follow your MD/Surgeons pre-procedure instructions regarding your medications     Do not eat or drink anything after 12:00 midnight prior to your surgery. This includes water chewing gum, mints and ice chips. You may brush your teeth and gargle the morning of your surgery, but do not swallow the water     Please see your family doctor/pediatrician for a history and physical and/or concerning medications. Bring any test results/reports from your physicians office. If you are under the care of a heart doctor or specialist doctor, please be aware that you may be asked to them for clearance    You may be asked to stop blood thinners such as Coumadin, Plavix, Fragmin, Lovenox, etc., or any anti-inflammatories such as:  Aspirin, Ibuprofen, Advil, Naproxen prior to your surgery. We also ask that you stop any OTC medications such as fish oil, vitamin E, glucosamine, garlic, Multivitamins, COQ 10, etc.    We ask that you do not smoke 24 hours prior to surgery  We ask that you do not  drink any alcoholic beverages 24 hours prior to surgery     You must make arrangements for a responsible adult to take you home after your surgery. For your safety you will not be allowed to leave alone or drive yourself home. Your surgery will be cancelled if you do not have a ride home. Also for your safety, it is strongly suggested that someone stay with you the first 24 hours after your surgery. A parent or legal guardian must accompany a child scheduled for surgery and plan to stay at the hospital until the child is discharged. Please do not bring other children with you. For your comfort, please wear simple loose fitting clothing to the hospital.  Please do not bring valuables.     Do not wear any make-up or nail polish on your fingers or toes      For your safety, please do not wear any jewelry or body piercing's on the day of surgery. All jewelry must be removed. If you have dentures, they will be removed before going to operating room. For your convenience, we will provide you with a container. If you wear contact lenses or glasses, they will be removed, please bring a case for them. If you have a living will and a durable power of  for healthcare, please bring in a copy. As part of our patient safety program to minimize surgical site infections, we ask you to do the following:    Please notify your surgeon if you develop any illness between         now and the  day of your surgery. This includes a cough, cold, fever, sore throat, nausea,         or vomiting, and diarrhea, etc.   Please notify your surgeon if you experience dizziness, shortness         of breath or blurred vision between now and the time of your surgery. Do not shave your operative site 96 hours prior to surgery. For face and neck surgery, men may use an electric razor 48 hours   prior to surgery. You may shower the night before surgery or the morning of   your surgery with an antibacterial soap. You will need to bring a photo ID and insurance card    [facility] has an onsite pharmacy, would you like to utilize our pharmacy     If you will be staying overnight and use a C-pap machine, please bring   your C-pap to hospital     Our goal is to provide you with excellent care, therefore, visitors will be limited to two(2) in the room at a time so that we may focus on providing this care for you. Please contact pre-admission testing if you have any further questions. [facility] phone number:  [phone] PAT fax number:  [phone]  Please note these are generalized instructions for all surgical cases, you may be provided with more specific instructions according to your surgery.     C-Difficile admission screening and protocol:       * Admitted with diarrhea? [] YES    [x]  NO     *Prior history of C-Diff. In last 3 months? [] YES    [x]  NO     *Antibiotic use in the past 6-8 weeks? []  NO    [x]  YES                 If yes, which ANTIBIOTIC AND REASON___post -op___     *Prior hospitalization or nursing home in the last month? []  YES    [x]  NO        SAFETY FIRST. .call before you fall

## 2023-02-02 NOTE — PROGRESS NOTES
Subjective:      Patient ID: Namita Florian is a 57 y.o. female. HPI  Chief Complaint: mesh removal  Patient referred by Dr. Charissa Angeles for evaluation of eroded mesh. Underwent transobturator tape 1 year ago. Found to have eroded mesh in vagina that was removed. Left groin inflammation tissue was incised. Patient reports symptoms of pain, swelling, drainage. Location of symptoms is left medial thigh. Symptoms were first noted about 4-5 weeks ago. Alleviated by nothing. Symptoms aggravated by palpation, rubbing area. Previous evaluation includes exam by Charissa Angeles. Patient has no significant medical history. Will plan following treatment: abx, left groin exploration next Friday. Past Medical History:   Diagnosis Date    Bee sting allergy     Hyperlipidemia     h/o not current    Obesity     Prolonged emergence from general anesthesia     Stress incontinence        Past Surgical History:   Procedure Laterality Date    BLEPHAROPLASTY Bilateral 2014    Dr Casa Anthony    COLONOSCOPY  09/06/2018    dr Jena Swain Left 2008    hammer toe repair; DR Collette Check Bilateral     Dr Ulisses Fonseca GASTROPLASTY  12/7/10    Laparoscopic sleeve gastrectomy (Dr Danitza Espazra)    [address] [facility] N/A 9/10/2021    CYSTOSCOPY, TRANS VAGINAL TAPING OBTERATOR performed by Kate Anders MD at [address] [place] [address] Left 1992       Current Outpatient Medications   Medication Sig Dispense Refill    EPINEPHrine (EPIPEN 2-GODWIN) 0.3 MG/0.3ML SOAJ injection INJECT 0.3 INTO THE SKIN ONCE FOR UP TO ONE DOSE AS DIRECTED FOR ALLERGIC REACTION 2 each 0    Multiple Vitamin (MULTIVITAMIN PO) Take 1 tablet by mouth nightly       aspirin 81 MG EC tablet Take 81 mg by mouth nightly        No current facility-administered medications for this visit. Prior to Admission medications    Medication Sig Start Date End Date Taking?  Authorizing Provider   EPINEPHrine (EPIPEN 2-GODWIN) 0.3 MG/0.3ML SOAJ injection INJECT 0.3 INTO THE SKIN ONCE FOR UP TO ONE DOSE AS DIRECTED FOR ALLERGIC REACTION 4/26/21   Keith Irving MD   Multiple Vitamin (MULTIVITAMIN PO) Take 1 tablet by mouth nightly     Historical Provider, MD   aspirin 81 MG EC tablet Take 81 mg by mouth nightly     Historical Provider, MD         Allergies   Allergen Reactions    Amoxicillin Hives and Itching    Bee Venom Anaphylaxis    Cefuroxime Axetil Hives and Itching    Penicillins Hives and Itching     All pcn related drugs    Ampicillin Hives       Social History     Socioeconomic History    Marital status:      Spouse name: Rhina Jarrett    Number of children: 0    Years of education: Not on file    Highest education level: Not on file   Occupational History    Occupation: RN     Employer: [employer]   Tobacco Use    Smoking status: Never    Smokeless tobacco: Never   Vaping Use    Vaping Use: Never used   Substance and Sexual Activity    Alcohol use: Yes     Comment: on weekends, 2-3 drinks    Drug use: Never    Sexual activity: Yes     Partners: Male     Comment:  Rhina Jarrett   Other Topics Concern    Not on file   Social History Narrative    Lives with , 1 dog     Social Determinants of Health     Financial Resource Strain: Low Risk     Difficulty of Paying Living Expenses: Not hard at all   Food Insecurity: No Food Insecurity    Worried About Running Out of Food in the Last Year: Never true    Ran Out of Food in the Last Year: Never true   Transportation Needs: Not on file   Physical Activity: Not on file   Stress: Not on file   Social Connections: Not on file   Intimate Partner Violence: Not on file   Housing Stability: Not on file       Family History   Problem Relation Age of Onset    Cancer Father         skin       Review of Systems   Constitutional: Negative. Skin:  Positive for wound. All other systems reviewed and are negative. Objective:   Physical Exam  Vitals reviewed.    Constitutional: General: She is not in acute distress. Appearance: She is well-developed. She is not diaphoretic. HENT:      Head: Normocephalic and atraumatic. Right Ear: External ear normal.      Left Ear: External ear normal.      Nose: Nose normal.   Eyes:      General: No scleral icterus. Conjunctiva/sclera: Conjunctivae normal.   Pulmonary:      Effort: Pulmonary effort is normal. No respiratory distress. Abdominal:      General: There is no distension. Palpations: Abdomen is soft. Tenderness: There is no abdominal tenderness. Musculoskeletal:         General: Normal range of motion. Cervical back: Normal range of motion and neck supple. Skin:     General: Skin is warm and dry. Findings: No erythema. Comments: Left superior medial thigh with hypergranulation tissue, small amount purulence   Neurological:      Mental Status: She is alert and oriented to person, place, and time. Psychiatric:         Behavior: Behavior normal.         Thought Content: Thought content normal.         Judgment: Judgment normal.       Assessment:       Diagnosis Orders   1. Erosion of implanted vaginal mesh to surrounding tissue, initial encounter 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 Patient expressed an understanding and is willing to proceed.           Vickie De La Cruz MD

## 2023-02-09 ENCOUNTER — ANESTHESIA EVENT (OUTPATIENT)
Dept: OPERATING ROOM | Age: 63
End: 2023-02-09
Payer: COMMERCIAL

## 2023-02-10 ENCOUNTER — HOSPITAL ENCOUNTER (OUTPATIENT)
Age: 63
Setting detail: OUTPATIENT SURGERY
Discharge: HOME | End: 2023-02-10
Attending: SURGERY | Admitting: SURGERY
Payer: COMMERCIAL

## 2023-02-10 ENCOUNTER — ANESTHESIA (OUTPATIENT)
Dept: OPERATING ROOM | Age: 63
End: 2023-02-10
Payer: COMMERCIAL

## 2023-02-10 VITALS
TEMPERATURE: 97.4 F | BODY MASS INDEX: 27.97 KG/M2 | DIASTOLIC BLOOD PRESSURE: 65 MMHG | HEART RATE: 47 BPM | WEIGHT: 167.88 LBS | OXYGEN SATURATION: 92 % | SYSTOLIC BLOOD PRESSURE: 112 MMHG | HEIGHT: 65 IN | RESPIRATION RATE: 12 BRPM

## 2023-02-10 DIAGNOSIS — T83.711A EROSION OF IMPLANTED VAGINAL MESH TO SURROUNDING TISSUE, INITIAL ENCOUNTER (HCC): ICD-10-CM

## 2023-02-10 PROCEDURE — 3600000002 HC SURGERY LEVEL 2 BASE: Performed by: SURGERY

## 2023-02-10 PROCEDURE — 7100000010 HC PHASE II RECOVERY - FIRST 15 MIN: Performed by: SURGERY

## 2023-02-10 PROCEDURE — 10121 INC&RMVL FB SUBQ TISS COMP: CPT | Performed by: SURGERY

## 2023-02-10 PROCEDURE — 7100000001 HC PACU RECOVERY - ADDTL 15 MIN: Performed by: SURGERY

## 2023-02-10 PROCEDURE — 2500000003 HC RX 250 WO HCPCS: Performed by: SURGERY

## 2023-02-10 PROCEDURE — 2580000003 HC RX 258: Performed by: ANESTHESIOLOGY

## 2023-02-10 PROCEDURE — 6360000002 HC RX W HCPCS: Performed by: SURGERY

## 2023-02-10 PROCEDURE — 2709999900 HC NON-CHARGEABLE SUPPLY: Performed by: SURGERY

## 2023-02-10 PROCEDURE — 7100000000 HC PACU RECOVERY - FIRST 15 MIN: Performed by: SURGERY

## 2023-02-10 PROCEDURE — 3700000000 HC ANESTHESIA ATTENDED CARE: Performed by: SURGERY

## 2023-02-10 PROCEDURE — 2500000003 HC RX 250 WO HCPCS: Performed by: NURSE ANESTHETIST, CERTIFIED REGISTERED

## 2023-02-10 PROCEDURE — 88300 SURGICAL PATH GROSS: CPT

## 2023-02-10 PROCEDURE — 6360000002 HC RX W HCPCS: Performed by: NURSE ANESTHETIST, CERTIFIED REGISTERED

## 2023-02-10 PROCEDURE — 2580000003 HC RX 258: Performed by: SURGERY

## 2023-02-10 PROCEDURE — 7100000011 HC PHASE II RECOVERY - ADDTL 15 MIN: Performed by: SURGERY

## 2023-02-10 PROCEDURE — 3600000012 HC SURGERY LEVEL 2 ADDTL 15MIN: Performed by: SURGERY

## 2023-02-10 PROCEDURE — A4217 STERILE WATER/SALINE, 500 ML: HCPCS | Performed by: SURGERY

## 2023-02-10 PROCEDURE — 3700000001 HC ADD 15 MINUTES (ANESTHESIA): Performed by: SURGERY

## 2023-02-10 RX ORDER — PROPOFOL 10 MG/ML
INJECTION, EMULSION INTRAVENOUS CONTINUOUS PRN
Status: DISCONTINUED | OUTPATIENT
Start: 2023-02-10 | End: 2023-02-10 | Stop reason: SDUPTHER

## 2023-02-10 RX ORDER — ONDANSETRON 2 MG/ML
INJECTION INTRAMUSCULAR; INTRAVENOUS PRN
Status: DISCONTINUED | OUTPATIENT
Start: 2023-02-10 | End: 2023-02-10 | Stop reason: SDUPTHER

## 2023-02-10 RX ORDER — SODIUM CHLORIDE 9 MG/ML
INJECTION, SOLUTION INTRAVENOUS PRN
Status: DISCONTINUED | OUTPATIENT
Start: 2023-02-10 | End: 2023-02-10 | Stop reason: HOSPADM

## 2023-02-10 RX ORDER — MIDAZOLAM HYDROCHLORIDE 1 MG/ML
INJECTION INTRAMUSCULAR; INTRAVENOUS PRN
Status: DISCONTINUED | OUTPATIENT
Start: 2023-02-10 | End: 2023-02-10 | Stop reason: SDUPTHER

## 2023-02-10 RX ORDER — BUPIVACAINE HYDROCHLORIDE 5 MG/ML
INJECTION, SOLUTION EPIDURAL; INTRACAUDAL
Status: COMPLETED | OUTPATIENT
Start: 2023-02-10 | End: 2023-02-10

## 2023-02-10 RX ORDER — FENTANYL CITRATE 50 UG/ML
INJECTION, SOLUTION INTRAMUSCULAR; INTRAVENOUS PRN
Status: DISCONTINUED | OUTPATIENT
Start: 2023-02-10 | End: 2023-02-10 | Stop reason: SDUPTHER

## 2023-02-10 RX ORDER — LIDOCAINE HYDROCHLORIDE AND EPINEPHRINE 10; 10 MG/ML; UG/ML
INJECTION, SOLUTION INFILTRATION; PERINEURAL
Status: COMPLETED | OUTPATIENT
Start: 2023-02-10 | End: 2023-02-10

## 2023-02-10 RX ORDER — CIPROFLOXACIN 2 MG/ML
400 INJECTION, SOLUTION INTRAVENOUS ONCE
Status: COMPLETED | OUTPATIENT
Start: 2023-02-10 | End: 2023-02-10

## 2023-02-10 RX ORDER — LIDOCAINE HYDROCHLORIDE 20 MG/ML
INJECTION, SOLUTION EPIDURAL; INFILTRATION; INTRACAUDAL; PERINEURAL PRN
Status: DISCONTINUED | OUTPATIENT
Start: 2023-02-10 | End: 2023-02-10 | Stop reason: SDUPTHER

## 2023-02-10 RX ORDER — FENTANYL CITRATE 50 UG/ML
25 INJECTION, SOLUTION INTRAMUSCULAR; INTRAVENOUS EVERY 5 MIN PRN
Status: DISCONTINUED | OUTPATIENT
Start: 2023-02-10 | End: 2023-02-10 | Stop reason: HOSPADM

## 2023-02-10 RX ORDER — SODIUM CHLORIDE 0.9 % (FLUSH) 0.9 %
5-40 SYRINGE (ML) INJECTION PRN
Status: DISCONTINUED | OUTPATIENT
Start: 2023-02-10 | End: 2023-02-10 | Stop reason: HOSPADM

## 2023-02-10 RX ORDER — ONDANSETRON 2 MG/ML
4 INJECTION INTRAMUSCULAR; INTRAVENOUS
Status: DISCONTINUED | OUTPATIENT
Start: 2023-02-10 | End: 2023-02-10 | Stop reason: HOSPADM

## 2023-02-10 RX ORDER — SODIUM CHLORIDE 0.9 % (FLUSH) 0.9 %
5-40 SYRINGE (ML) INJECTION EVERY 12 HOURS SCHEDULED
Status: DISCONTINUED | OUTPATIENT
Start: 2023-02-10 | End: 2023-02-10 | Stop reason: HOSPADM

## 2023-02-10 RX ORDER — MAGNESIUM HYDROXIDE 1200 MG/15ML
LIQUID ORAL CONTINUOUS PRN
Status: COMPLETED | OUTPATIENT
Start: 2023-02-10 | End: 2023-02-10

## 2023-02-10 RX ORDER — PHENYLEPHRINE HCL IN 0.9% NACL 1 MG/10 ML
SYRINGE (ML) INTRAVENOUS PRN
Status: DISCONTINUED | OUTPATIENT
Start: 2023-02-10 | End: 2023-02-10 | Stop reason: SDUPTHER

## 2023-02-10 RX ADMIN — Medication 200 MCG: at 11:27

## 2023-02-10 RX ADMIN — ONDANSETRON 4 MG: 2 INJECTION INTRAMUSCULAR; INTRAVENOUS at 11:10

## 2023-02-10 RX ADMIN — PROPOFOL 160 MCG/KG/MIN: 10 INJECTION, EMULSION INTRAVENOUS at 11:06

## 2023-02-10 RX ADMIN — FENTANYL CITRATE 50 MCG: 50 INJECTION INTRAMUSCULAR; INTRAVENOUS at 11:14

## 2023-02-10 RX ADMIN — Medication 200 MCG: at 11:32

## 2023-02-10 RX ADMIN — FENTANYL CITRATE 50 MCG: 50 INJECTION INTRAMUSCULAR; INTRAVENOUS at 11:06

## 2023-02-10 RX ADMIN — Medication 100 MCG: at 11:39

## 2023-02-10 RX ADMIN — CIPROFLOXACIN 400 MG: 2 INJECTION, SOLUTION INTRAVENOUS at 11:05

## 2023-02-10 RX ADMIN — SODIUM CHLORIDE: 9 INJECTION, SOLUTION INTRAVENOUS at 09:11

## 2023-02-10 RX ADMIN — LIDOCAINE HYDROCHLORIDE 60 MG: 20 INJECTION, SOLUTION EPIDURAL; INFILTRATION; INTRACAUDAL; PERINEURAL at 11:06

## 2023-02-10 RX ADMIN — Medication 100 MCG: at 11:16

## 2023-02-10 RX ADMIN — Medication 100 MCG: at 11:20

## 2023-02-10 RX ADMIN — MIDAZOLAM 2 MG: 1 INJECTION INTRAMUSCULAR; INTRAVENOUS at 11:03

## 2023-02-10 ASSESSMENT — PAIN DESCRIPTION - DESCRIPTORS: DESCRIPTORS: TENDER

## 2023-02-10 ASSESSMENT — PAIN - FUNCTIONAL ASSESSMENT
PAIN_FUNCTIONAL_ASSESSMENT: 0-10
PAIN_FUNCTIONAL_ASSESSMENT: ACTIVITIES ARE NOT PREVENTED

## 2023-02-10 ASSESSMENT — PAIN SCALES - GENERAL: PAINLEVEL_OUTOF10: 0

## 2023-02-10 NOTE — PROGRESS NOTES
Patient admitted to PACU # 4 from OR at 1142 post left groin exploration per MD Pena. Attached to PACU monitoring system and report received from anesthesia provider. Patient was reported to be hemodynamically stable during procedure. Patient drowsy on admission, oral airway in place. Dressing is clean, dry, and intact. Will continue to monitor.

## 2023-02-10 NOTE — PROGRESS NOTES
Pt tolerates PO. VSS. Discharge instructions given to pt and . Both express an understanding of instructions. Denies pain at present.

## 2023-02-10 NOTE — PROGRESS NOTES
PACU Transfer to [facility]    Vitals:    02/10/23 1200   BP: 100/73   Pulse: 60   Resp: 12   Temp: 97.4 °F (36.3 °C)   SpO2: 95%         Intake/Output Summary (Last 24 hours) at 2/10/2023 1205  Last data filed at 2/10/2023 1136  Gross per 24 hour   Intake 800 ml   Output --   Net 800 ml       Pain assessment:  none  Pain Level: 0    Patient transferred to care of [facility] RN.    2/10/2023 12:05 PM

## 2023-02-10 NOTE — H&P
Update History & Physical    The patient's History and Physical of February 2, 2023 was reviewed with the patient and I examined the patient. There was no change. Plan left groin wound exploration, removal mesh. The surgical site was confirmed by the patient and me. Plan: The risks, benefits, expected outcome, and alternative to the recommended procedure have been discussed with the patient. Patient understands and wants to proceed with the procedure.      Electronically signed by Leslie Bone MD on 2/10/2023 at 10:49 AM

## 2023-02-10 NOTE — ANESTHESIA PRE PROCEDURE
[facility] Department of Anesthesiology  Pre-Anesthesia Evaluation/Consultation       Name:  Cory Jones  : 1960  Age:  57 y.o.                                            MRN:  [id number]  Date: 2/10/2023           Surgeon: Surgeon(s):  Estefanía Palacios MD    Procedure: Procedure(s):  LEFT GROIN EXPLORATION     Allergies   Allergen Reactions    Bee Venom Anaphylaxis    Penicillins Hives, Shortness Of Breath and Itching     All pcn related drugs    Amoxicillin Hives and Itching    Ampicillin Hives    Cefuroxime Axetil Hives and Itching     Patient Active Problem List   Diagnosis    Vitamin D deficiency    Bee sting allergy    Heart palpitations- pvc's on ekg    Primary osteoarthritis of right knee    H/O colonoscopy with polypectomy 18, Dr Héctor Miller, 1 polyp (2mm)    LEVON (stress urinary incontinence, female)    Erosion of implanted vaginal mesh to surrounding organ or tissue [facility])     Past Medical History:   Diagnosis Date    Bee sting allergy     Hyperlipidemia     h/o not current    Prolonged emergence from general anesthesia     Stress incontinence      Past Surgical History:   Procedure Laterality Date    BLADDER SURGERY      bladder sling removed    BLEPHAROPLASTY Bilateral 2014    Dr Justin Nina  2018    dr Jayy Car Left 2008    hammer toe repair; DR August Tyler LASIK Bilateral     Dr Fernanda Arzate GASTROPLASTY  2010    Laparoscopic sleeve gastrectomy (Dr Ayanna Reed)   [facility] N/A 09/10/2021    CYSTOSCOPY, TRANS VAGINAL TAPING OBTERATOR performed by Michaela Cerda MD at [address] [facility]     ganglion cyst     Social History     Tobacco Use    Smoking status: Never    Smokeless tobacco: Never   Vaping Use    Vaping Use: Never used   Substance Use Topics    Alcohol use: Yes     Comment: on weekends, 2-3 drinks    Drug use: Never     Medications  No current facility-administered medications on file prior to encounter. Current Outpatient Medications on File Prior to Encounter   Medication Sig Dispense Refill    Biotin 10 MG CHEW Take by mouth      EPINEPHrine (EPIPEN 2-GODWIN) 0.3 MG/0.3ML SOAJ injection INJECT 0.3 INTO THE SKIN ONCE FOR UP TO ONE DOSE AS DIRECTED FOR ALLERGIC REACTION (Patient not taking: Reported on 2/10/2023) 2 each 0    Multiple Vitamin (MULTIVITAMIN PO) Take 1 tablet by mouth nightly       aspirin 81 MG EC tablet Take 81 mg by mouth nightly        Current Facility-Administered Medications   Medication Dose Route Frequency Provider Last Rate Last Admin    ciprofloxacin (CIPRO) IVPB 400 mg  400 mg IntraVENous Once Toy Diggs MD        sodium chloride flush 0.9 % injection 5-40 mL  5-40 mL IntraVENous 2 times per day Brook Martínez MD        sodium chloride flush 0.9 % injection 5-40 mL  5-40 mL IntraVENous PRN Brook Martínez MD        0.9 % sodium chloride infusion   IntraVENous PRN Brook Martínez MD 10 mL/hr at 02/10/23 0911 New Bag at 02/10/23 0911     Vital Signs (Current)   Vitals:    02/10/23 0904   BP: 113/80   Pulse: 71   Resp: 18   Temp: 97.2 °F (36.2 °C)   SpO2: 97%     Vital Signs Statistics (for past 48 hrs)     Temp  Av.2 °F (36.2 °C)  Min: 97.2 °F (36.2 °C)   Min taken time: 02/10/23 0904  Max: 97.2 °F (36.2 °C)   Max taken time: 02/10/23 0904  Pulse  Av  Min: 70   Min taken time: 02/10/23 0904  Max: 70   Max taken time: 02/10/23 0904  Resp  Av  Min: 25   Min taken time: 02/10/23 0904  Max: 25   Max taken time: 02/10/23 0904  BP  Min: 113/80   Min taken time: 02/10/23 0904  Max: 113/80   Max taken time: 02/10/23 0904  SpO2  Av %  Min: 97 %   Min taken time: 02/10/23 0904  Max: 97 %   Max taken time: 02/10/23 0904    BP Readings from Last 3 Encounters:   02/10/23 113/80   23 118/84   23 122/80     BMI  Body mass index is 27.94 kg/m².   Estimated body mass index is 27.94 kg/m² as calculated from the following:    Height as of this encounter: 5' 5\" (1.651 m). Weight as of this encounter: 167 lb 14.1 oz (76.1 kg). CBC   Lab Results   Component Value Date/Time    WBC 5.4 08/10/2018 11:42 AM    RBC 4.31 08/10/2018 11:42 AM    HGB 13.7 08/10/2018 11:42 AM    HCT 41.1 08/10/2018 11:42 AM    MCV 95.4 08/10/2018 11:42 AM    RDW 13.1 08/10/2018 11:42 AM     08/10/2018 11:42 AM     CMP    Lab Results   Component Value Date/Time     08/10/2018 11:42 AM    K 4.1 08/10/2018 11:42 AM     08/10/2018 11:42 AM    CO2 25 08/10/2018 11:42 AM    BUN 11 08/10/2018 11:42 AM    CREATININE 0.6 08/10/2018 11:42 AM    GFRAA >60 08/10/2018 11:42 AM    GFRAA >60 05/08/2012 06:11 PM    AGRATIO 1.7 08/10/2018 11:42 AM    LABGLOM >60 08/10/2018 11:42 AM    GLUCOSE 86 08/29/2022 09:37 AM    PROT 7.3 08/10/2018 11:42 AM    PROT 7.1 05/08/2012 06:11 PM    CALCIUM 9.4 08/10/2018 11:42 AM    BILITOT 0.5 08/10/2018 11:42 AM    ALKPHOS 117 08/10/2018 11:42 AM    AST 20 08/10/2018 11:42 AM    ALT 15 08/10/2018 11:42 AM     BMP    Lab Results   Component Value Date/Time     08/10/2018 11:42 AM    K 4.1 08/10/2018 11:42 AM     08/10/2018 11:42 AM    CO2 25 08/10/2018 11:42 AM    BUN 11 08/10/2018 11:42 AM    CREATININE 0.6 08/10/2018 11:42 AM    CALCIUM 9.4 08/10/2018 11:42 AM    GFRAA >60 08/10/2018 11:42 AM    GFRAA >60 05/08/2012 06:11 PM    LABGLOM >60 08/10/2018 11:42 AM    GLUCOSE 86 08/29/2022 09:37 AM     POCGlucose  No results for input(s): GLUCOSE in the last 72 hours.    Coags  No results found for: PROTIME, INR, APTT  HCG (If Applicable)   Lab Results   Component Value Date    PREGTESTUR Neg 12/07/2010      ABGs No results found for: PHART, PO2ART, GSM8HDD, YDG4QFG, BEART, E5EOPSCY   Type & Screen (If Applicable)  Lab Results   Component Value Date    LABABO A 11/30/2010    LABRH Positive 11/30/2010                            BMI: Wt Readings from Last 3 Encounters: NPO Status:   Date of last liquid consumption: 02/09/23   Time of last liquid consumption: 2330   Date of last solid food consumption: 02/09/23      Time of last solid consumption: 2330       Anesthesia Evaluation  Patient summary reviewed no history of anesthetic complications:   Airway: Mallampati: III  TM distance: >3 FB   Neck ROM: full  Mouth opening: > = 3 FB   Dental: normal exam         Pulmonary:Negative Pulmonary ROS and normal exam                               Cardiovascular:  Exercise tolerance: good (>4 METS),   (+) dysrhythmias: PVC, hyperlipidemia      ECG reviewed  Rhythm: regular  Rate: normal           Beta Blocker:  Not on Beta Blocker         Neuro/Psych:   Negative Neuro/Psych ROS              GI/Hepatic/Renal: Neg GI/Hepatic/Renal ROS       (-) GERD, liver disease and no renal disease       Endo/Other: Negative Endo/Other ROS       (-) diabetes mellitus, blood dyscrasia               Abdominal:             Vascular: negative vascular ROS. Other Findings:           Anesthesia Plan      MAC     ASA 2       Induction: intravenous. MIPS: Postoperative opioids intended and Prophylactic antiemetics administered. Anesthetic plan and risks discussed with patient and spouse. Plan discussed with CRNA. This pre-anesthesia assessment may be used as a history and physical.    DOS STAFF ADDENDUM:    Pt seen and examined, chart reviewed (including anesthesia, drug and allergy history). No interval changes to history and physical examination. Anesthetic plan, risks, benefits, alternatives, and personnel involved discussed with patient. Questions and concerns addressed. Patient(family) verbalized an understanding and agrees to proceed.       Christa Martin MD  February 10, 2023  9:30 AM

## 2023-02-10 NOTE — ANESTHESIA POSTPROCEDURE EVALUATION
[facility] Department of Anesthesiology  Post-Anesthesia Note       Name:  Phyllis Gee                                  Age:  57 y.o. MRN:  [id number]     Last Vitals & Oxygen Saturation: BP (!) 97/53   Pulse 52   Temp 97.4 °F (36.3 °C) (Temporal)   Resp 16   Ht 5' 5\" (1.651 m)   Wt 167 lb 14.1 oz (76.1 kg)   LMP 11/15/2010   SpO2 99%   BMI 27.94 kg/m²   Patient Vitals for the past 4 hrs:   BP Temp Temp src Pulse Resp SpO2 Height Weight   02/10/23 1215 (!) 97/53 97.4 °F (36.3 °C) Temporal 52 16 99 % -- --   02/10/23 1200 100/73 97.4 °F (36.3 °C) -- 60 12 95 % -- --   02/10/23 1156 105/62 -- -- 70 14 97 % -- --   02/10/23 1155 (!) 78/48 -- -- 52 10 98 % -- --   02/10/23 1150 (!) 80/55 -- -- 54 11 98 % -- --   02/10/23 1145 95/60 -- -- 57 10 97 % -- --   02/10/23 1142 (!) 80/51 97.4 °F (36.3 °C) -- 54 10 98 % -- --   02/10/23 0904 113/80 97.2 °F (36.2 °C) Temporal 71 18 97 % 5' 5\" (1.651 m) 167 lb 14.1 oz (76.1 kg)       Level of consciousness:  Awake, alert    Respiratory: Respirations easy, no distress. Stable. Cardiovascular: Hemodynamically stable. Hydration: Adequate. PONV: Adequately managed. Post-op pain: Adequately controlled. Post-op assessment: Tolerated anesthetic well without complication. Complications:  None.     Mehdi Saeed MD  February 10, 2023   12:31 PM

## 2023-02-10 NOTE — BRIEF OP NOTE
Brief Postoperative Note      Patient: Kim Almonte  YOB: 1960  MRN: [id number]    Date of Procedure: 2/10/2023    Pre-Op Diagnosis: EROSION OF IMPLANTED VAGINAL MESH TO SURROUNDING TISSUE    Post-Op Diagnosis: Same       Procedure(s):  LEFT GROIN EXPLORATION    Surgeon(s):  Sue Dupree MD    Assistant:  Surgical Assistant: Nasra Infante    Anesthesia: Monitor Anesthesia Care    Estimated Blood Loss (mL): Minimal    Complications: None    Specimens:   ID Type Source Tests Collected by Time Destination   A : A.  INFECTED GROIN MESH FOR GROSS ONLY Specimen Groin SURGICAL PATHOLOGY Sue Dupree MD 2/10/2023 1131        Implants:  * No implants in log *      Drains: * No LDAs found *    Findings: 5 cm mesh removed from medial thigh    Electronically signed by Sue Dupree MD on 2/10/2023 at 11:36 AM

## 2023-02-10 NOTE — DISCHARGE INSTRUCTIONS
Shamika Abdi MD     General and Vascular Surgery   Bam Hamilton MD     [address] [facility] [address]     [address] [facility] [address], [place] [address]  Jessica Webster CNP    Phone: [phone]           Fax: [phone]                                                                 POST-OPERATIVE INSTRUCTIONS FOR GROIN EXPLORATION    Call the office to schedule your post-operative appointment with your surgeon for two (2) weeks. You will have a water occlusive dressing covering your incisions. Remove on Sunday    You may shower. Wash incisions gently, and pat them dry. Do not rub your incisions. Do not soak incisions in tub baths, hot tubs or pools    General guidelines for activity:  Avoid strenuous activity or lifting anything heavier than 15 pounds for one week. It is OK to be up walking around; walking up and down stairs is also OK. Do what is comfortable: stop and rest when you feel tired. Drink plenty of fluids and stay on a bland diet for 2-3 days after surgery. Do NOT drive for 3 days and while taking your narcotic pain medicine. Watch for signs of infection:   Fever over 100.5°   Excessive warmth or bright redness around your incisions  Leakage of bloody or cloudy fluid from you incisions    You may take ibuprofen or tylenol as needed for pain    If you experience constipation  Increase your water intake, and activity; walking is best.  A stool softener or mild laxative may be necessary if you still have not had a bowel  movement ; call the office for further instructions.

## 2023-02-10 NOTE — OP NOTE
[address] [place] [address]                                OPERATIVE REPORT    PATIENT NAME: Harvin Cogan                     :        1960  MED REC NO:   [id number]                          ROOM:  ACCOUNT NO:   [de-identified]                           ADMIT DATE: 02/10/2023  PROVIDER:     Toy Diggs MD    DATE OF PROCEDURE:  02/10/2023    PREOPERATIVE DIAGNOSIS:  Infected TVT mesh of the left groin. POSTOPERATIVE DIAGNOSIS:  Infected TVT mesh of the left groin,    OPERATION PERFORMED:  Left groin exploration with removal of infected  TVT mesh. SURGEON:  Toy Diggs MD    ANESTHESIA:  MAC with local anesthetic. ASA CLASS:  II.    ANTIBIOTICS:  Cipro 400 IV. DVT PROPHYLAXIS:  Bilateral pneumatic compression devices. ESTIMATED BLOOD LOSS:  Less than 50 mL. SPECIMEN:  Mesh for gross evaluation only. INDICATIONS:  The patient is a 73-year-old female who has had a history  of TVT placement via the obturator approach for incontinence. She had  erosion of the mesh to the vagina that was removed by Dr. Bonny Kam. She  also had an area of chronic abscess of the left groin that he explored  initially to try to find the mesh. However, it was not found at that  time. Her urologist sent her to me to evaluate for removal.  I gave her  antibiotics in the interim and then we scheduled for left groin  exploration with removal of infected mesh. Risk of bleeding, infection,  anesthesia, risks of injuring surrounding structures as well as  recurrence were discussed at length and she was agreeable to proceed. OPERATIVE PROCEDURE:  The patient was brought to the operating suite and  placed in the supine position on the operating room table. Anesthesia  was induced that she tolerated well. Her left leg was placed in the  frog-leg position and the area prepped and draped in usual sterile  fashion.   Time-out performed. After injecting local anesthetic, a 3 x 2 cm elliptical incision was  made around the chronic thinned out abscess cavity. This was incised  and sent off the back table. We then went down and with a combination  of blunt dissection, was able to identify the previously placed mesh. It was bluntly dissected with the hemostat around it. I opened up the  fascia superiorly overlying the muscle in the medial groin. This will  then allow me to lisset this mesh up higher into the muscle. About 5 cm  of the mesh was removed and I had a discussion with Dr. Melita Brooks as long  as it was well incorporated, we could leave the remainder of the mesh as  it was fairly fixated to the posterior pubic rami. It was well  incorporated and no evidence of gross infection at the site that I had  transected. The mesh itself was sent off to Pathology. No other  abnormalities were appreciated. The area was irrigated and all foreign  material visible was removed. Fascia was then closed with 2-0 Vicryl  and then the wound was loosely approximated with 4-0 nylon and dressings  applied. The patient tolerated the procedure well. She was taken to  the PACU in stable condition. All counts were correct at the end of the  case.       Leno Jasso MD    D: 02/10/2023 12:59:47       T: 02/10/2023 14:27:18     [id number]  Job#: [id number]     Doc#: [id number]    CC:  Krysta Acosta MD

## 2023-02-16 ENCOUNTER — PROCEDURE VISIT (OUTPATIENT)
Dept: DERMATOLOGY | Age: 63
End: 2023-02-16

## 2023-02-16 DIAGNOSIS — C44.519 BASAL CELL CARCINOMA (BCC) OF BACK: Primary | ICD-10-CM

## 2023-02-16 NOTE — PATIENT INSTRUCTIONS
Hylauronic acid [facility]       Care of Wound Closed with Dermabond (GLUE)    A special skin glue called Dermabond was used to hold your wound together on the surface of the skin. The glue film will loosen from your skin on its own as the wound heals. Follow these care guidelines:    Keep the wound dry. Do not pick, scratch or rub the glue on the wound so it does not loosen before the wound heals. Do not soak your wound in water until the glue film falls off. Avoid swimming or using a hot tub while the glue is in place. When you shower or bathe, let water run over the wound but do not rub. Pat the wound gently with a soft towel to dry. Do no apply any cream, lotion or ointment to the skin near the wound. It could loosen the glue before the wound heals. Do not apply any tape, sticky dressing, or alcohol to the glue site for 10 days. These could also loosen the glue. Call your doctor if you have any of these signs of infection:    Skin around the wound is more red, swollen or feels hot. Fluid builds up under the glue    Wound smells bad    Pus drainage    Fever     Increasing pain    Surgical Wound Care Instructions    After your surgery, go home and take it easy. Please refrain from any vigorous physical activity or heavy lifting for 14 days. Leave the pressure bandage in place for 48 hours. If it starts to detach, reinforce the bandage with another piece of tape. Please keep the bandage dry for 48 hours. After 48 hours, the wound can get wet but do not soak or scrub the area. Apply a non stick bandage or non stick gauze pad to the site daily with medical tape for a total of 14 days. Complications:  If bleeding develops when you go home, apply pressure for 15 minutes continuously. A small amount of ice in a bag covered with a towel may be used for another 10 minutes if necessary.   If the bleeding does not stop, please call your dermatologist.  Please call the office if you develop any fevers, chills or pus drains from the wound. A small amount of redness at the site of the surgery is normal at first, but if the redness begins to spread and/or pain worsens, you may have an infection and need to call the office. Wound Care Instructions [facility])    Keep the bandage in place for 24 hours. Cleanse the wound with mild soapy water daily  Gently dry the area. Apply Vaseline or petroleum jelly to the wound using a cotton tipped applicator. Cover with a clean bandage. Repeat this process until the biopsy site is healed. If you had stitches placed, continue treating the site until the stitches are removed. Remember to make an appointment to return to have your stitches removed by our staff.   You may shower and bathe as usual.

## 2023-02-21 LAB — DERMATOLOGY PATHOLOGY REPORT: ABNORMAL

## 2023-02-23 ENCOUNTER — OFFICE VISIT (OUTPATIENT)
Dept: SURGERY | Age: 63
End: 2023-02-23

## 2023-02-23 VITALS — BODY MASS INDEX: 27.79 KG/M2 | WEIGHT: 167 LBS

## 2023-02-23 DIAGNOSIS — T83.711A EROSION OF IMPLANTED VAGINAL MESH TO SURROUNDING TISSUE, INITIAL ENCOUNTER (HCC): Primary | ICD-10-CM

## 2023-02-23 PROCEDURE — 99024 POSTOP FOLLOW-UP VISIT: CPT | Performed by: SURGERY

## 2023-02-23 NOTE — PROGRESS NOTES
Subjective:      Patient ID: Annie Nelson is a 57 y.o. female. HPI  S/p left thigh exploration and removal mesh. Doing well. Some discomfort with location and underwear rubbing. Denies drainage. No apparent complications  Review of Systems    Objective:   Physical Exam  Wound healed. No cellulitis  Sutures removed  Assessment:       Diagnosis Orders   1.  Erosion of implanted vaginal mesh to surrounding tissue, initial encounter [facility])                Plan:      Discussed operative findings  No restrictions  Return PRDOMINIK Gustafson MD

## 2023-03-02 ENCOUNTER — OFFICE VISIT (OUTPATIENT)
Dept: UROGYNECOLOGY | Age: 63
End: 2023-03-02

## 2023-03-02 VITALS
HEART RATE: 76 BPM | TEMPERATURE: 97.7 F | DIASTOLIC BLOOD PRESSURE: 76 MMHG | RESPIRATION RATE: 16 BRPM | SYSTOLIC BLOOD PRESSURE: 113 MMHG | OXYGEN SATURATION: 96 %

## 2023-03-02 DIAGNOSIS — Z09 POSTOP CHECK: Primary | ICD-10-CM

## 2023-03-02 PROCEDURE — 99024 POSTOP FOLLOW-UP VISIT: CPT | Performed by: OBSTETRICS & GYNECOLOGY

## 2023-03-02 NOTE — PROGRESS NOTES
3/2/2023       HPI:     Name: Kim Almonte  YOB: 1960  78  CC: Kim Almonte is a 57 y.o. female who is here for a 6 week post op evaluation. HPI: Recently underwent  REMOVAL OF MESH, INCISION AND DRAINAGE OF LEFT GROIN on 1/4/23. Voiding difficulty: No  Initiating stream: No  Force stream: No  Lean forward to empty: No  Slow/intermittent stream: No  Unable to empty bladder: No  Incontinence: stress incontinence, but not as bad  Urgency without incontinence: No   Frequency without incontinence: No   Bowel functions: normal   Pain Controlled: No    Past Medical History:   Past Medical History:   Diagnosis Date    Bee sting allergy     Hyperlipidemia     h/o not current    Prolonged emergence from general anesthesia     Stress incontinence      Past Surgical History:   Past Surgical History:   Procedure Laterality Date    BLADDER SURGERY  2023    bladder sling removed    BLEPHAROPLASTY Bilateral 2014    Dr Joi Junior    COLONOSCOPY  09/06/2018    dr Sotero Dsouza Left 2008    hammer toe repair; DR Adiel Celeste SURGERY Left 2/10/2023    LEFT GROIN EXPLORATION performed by Sue Dupree MD at [place] [address] Bilateral     Dr Delia Garvey GASTROPLASTY  12/07/2010    Laparoscopic sleeve gastrectomy (Dr Zarina Pradhan)    [id number] [facility] N/A 09/10/2021    CYSTOSCOPY, TRANS VAGINAL TAPING OBTERATOR performed by Ashwin Carlton MD at [address] [place] [address] 1992    ganglion cyst     Current Medications:  Current Outpatient Medications   Medication Sig Dispense Refill    Biotin 10 MG CHEW Take by mouth      EPINEPHrine (EPIPEN 2-GODWIN) 0.3 MG/0.3ML SOAJ injection INJECT 0.3 INTO THE SKIN ONCE FOR UP TO ONE DOSE AS DIRECTED FOR ALLERGIC REACTION 2 each 0    Multiple Vitamin (MULTIVITAMIN PO) Take 1 tablet by mouth nightly       aspirin 81 MG EC tablet Take 81 mg by mouth nightly        No current facility-administered medications for this visit.      Allergies: Allergies   Allergen Reactions    Bee Venom Anaphylaxis    Penicillins Hives, Shortness Of Breath and Itching     All pcn related drugs    Amoxicillin Hives and Itching    Ampicillin Hives    Cefuroxime Axetil Hives and Itching     Social History:   Social History     Socioeconomic History    Marital status:      Spouse name: Mesha Trejo    Number of children: 0    Years of education: Not on file    Highest education level: Not on file   Occupational History    Occupation: RN     Employer: [employer]   Tobacco Use    Smoking status: Never    Smokeless tobacco: Never   Vaping Use    Vaping Use: Never used   Substance and Sexual Activity    Alcohol use: Yes     Comment: on weekends, 2-3 drinks    Drug use: Never    Sexual activity: Yes     Partners: Male     Comment:  Mesha Trejo   Other Topics Concern    Not on file   Social History Narrative    Lives with , 1 dog     Social Determinants of Health     Financial Resource Strain: Low Risk     Difficulty of Paying Living Expenses: Not hard at all   Food Insecurity: No Food Insecurity    Worried About Running Out of Food in the Last Year: Never true    Ran Out of Food in the Last Year: Never true   Transportation Needs: Not on file   Physical Activity: Not on file   Stress: Not on file   Social Connections: Not on file   Intimate Partner Violence: Not on file   Housing Stability: Not on file     Family History:   Family History   Problem Relation Age of Onset    Cancer Father         skin         Objective:     Vitals  Vitals:    03/02/23 0815   BP: 113/76   Pulse: 76   Resp: 16   Temp: 97.7 °F (36.5 °C)   SpO2: 96%     Physical Exam  Physical Exam  All surgical incisions healing well. No erythema. No evidence of hematoma or abscess. No results found for this visit on 03/02/23. Assessnent/Plan:     Phyllis Gee is a 57 y.o. female with   1.  Postop check        She is doing very well from the removal of the suburethral sling vaginally and her left groin incision appears to be healing well also. She will follow-up with us on an as-needed basis    No orders of the defined types were placed in this encounter. No orders of the defined types were placed in this encounter.       Mirian Pike MD

## 2023-04-19 ENCOUNTER — OFFICE VISIT (OUTPATIENT)
Dept: SURGERY | Age: 63
End: 2023-04-19

## 2023-04-19 VITALS — BODY MASS INDEX: 27.79 KG/M2 | WEIGHT: 167 LBS

## 2023-04-19 DIAGNOSIS — T83.711A EROSION OF IMPLANTED VAGINAL MESH TO SURROUNDING TISSUE, INITIAL ENCOUNTER (HCC): Primary | ICD-10-CM

## 2023-04-19 PROCEDURE — 99024 POSTOP FOLLOW-UP VISIT: CPT | Performed by: SURGERY

## 2023-04-19 RX ORDER — DOXYCYCLINE HYCLATE 100 MG
100 TABLET ORAL 2 TIMES DAILY
Qty: 14 TABLET | Refills: 1 | Status: SHIPPED | OUTPATIENT
Start: 2023-04-19 | End: 2023-04-26

## 2023-04-20 NOTE — PROGRESS NOTES
Subjective:      Patient ID: Jenny Caldera is a 57 y.o. female. HPI  S/p excision infected mesh left thigh 2.5 months ago. Reports increased swelling the last few days. Denies fever, drainage, redness. Concerned because she is going out of the country in a few weeks  Review of Systems    Objective:   Physical Exam  Chaperone present  Left groin wound healed  SQ Induration surrounding wound. No cellulitis  Assessment:       Diagnosis Orders   1. Erosion of implanted vaginal mesh to surrounding tissue, initial encounter [facility])                Plan:       Will treat with empiric abx for now  F/u drainage or other concerns        Merrily Goldmann, MD

## 2023-06-26 ENCOUNTER — OFFICE VISIT (OUTPATIENT)
Dept: FAMILY MEDICINE CLINIC | Age: 63
End: 2023-06-26
Payer: COMMERCIAL

## 2023-06-26 VITALS
HEART RATE: 104 BPM | WEIGHT: 168 LBS | BODY MASS INDEX: 27.99 KG/M2 | HEIGHT: 65 IN | RESPIRATION RATE: 14 BRPM | DIASTOLIC BLOOD PRESSURE: 68 MMHG | OXYGEN SATURATION: 96 % | SYSTOLIC BLOOD PRESSURE: 110 MMHG

## 2023-06-26 DIAGNOSIS — Z13.1 SCREENING FOR DIABETES MELLITUS: ICD-10-CM

## 2023-06-26 DIAGNOSIS — Z00.00 WELL ADULT EXAM: Primary | ICD-10-CM

## 2023-06-26 DIAGNOSIS — Z11.59 ENCOUNTER FOR SCREENING FOR VIRAL DISEASE: ICD-10-CM

## 2023-06-26 DIAGNOSIS — Z13.220 SCREENING CHOLESTEROL LEVEL: ICD-10-CM

## 2023-06-26 DIAGNOSIS — Z78.0 POSTMENOPAUSAL: ICD-10-CM

## 2023-06-26 PROCEDURE — 99397 PER PM REEVAL EST PAT 65+ YR: CPT | Performed by: FAMILY MEDICINE

## 2023-06-26 ASSESSMENT — PATIENT HEALTH QUESTIONNAIRE - PHQ9
SUM OF ALL RESPONSES TO PHQ QUESTIONS 1-9: 0
SUM OF ALL RESPONSES TO PHQ QUESTIONS 1-9: 0
2. FEELING DOWN, DEPRESSED OR HOPELESS: 0
1. LITTLE INTEREST OR PLEASURE IN DOING THINGS: 0
SUM OF ALL RESPONSES TO PHQ QUESTIONS 1-9: 0
SUM OF ALL RESPONSES TO PHQ9 QUESTIONS 1 & 2: 0
SUM OF ALL RESPONSES TO PHQ QUESTIONS 1-9: 0

## 2023-06-26 ASSESSMENT — ENCOUNTER SYMPTOMS
ALLERGIC/IMMUNOLOGIC NEGATIVE: 1
RESPIRATORY NEGATIVE: 1
GASTROINTESTINAL NEGATIVE: 1
EYES NEGATIVE: 1

## 2023-07-06 DIAGNOSIS — Z13.1 SCREENING FOR DIABETES MELLITUS: ICD-10-CM

## 2023-07-06 DIAGNOSIS — Z11.59 ENCOUNTER FOR SCREENING FOR VIRAL DISEASE: ICD-10-CM

## 2023-07-06 DIAGNOSIS — Z13.220 SCREENING CHOLESTEROL LEVEL: ICD-10-CM

## 2023-07-06 LAB
ALBUMIN SERPL-MCNC: 4.4 G/DL (ref 3.4–5)
ALBUMIN/GLOB SERPL: 1.5 {RATIO} (ref 1.1–2.2)
ALP SERPL-CCNC: 131 U/L (ref 40–129)
ALT SERPL-CCNC: 16 U/L (ref 10–40)
ANION GAP SERPL CALCULATED.3IONS-SCNC: 9 MMOL/L (ref 3–16)
AST SERPL-CCNC: 22 U/L (ref 15–37)
BILIRUB SERPL-MCNC: 0.4 MG/DL (ref 0–1)
BUN SERPL-MCNC: 14 MG/DL (ref 7–20)
CALCIUM SERPL-MCNC: 9.9 MG/DL (ref 8.3–10.6)
CHLORIDE SERPL-SCNC: 103 MMOL/L (ref 99–110)
CHOLEST SERPL-MCNC: 236 MG/DL (ref 0–199)
CO2 SERPL-SCNC: 29 MMOL/L (ref 21–32)
CREAT SERPL-MCNC: 0.7 MG/DL (ref 0.6–1.2)
GFR SERPLBLD CREATININE-BSD FMLA CKD-EPI: >60 ML/MIN/{1.73_M2}
GLUCOSE SERPL-MCNC: 83 MG/DL (ref 70–99)
HDLC SERPL-MCNC: 80 MG/DL (ref 40–60)
LDLC SERPL CALC-MCNC: 132 MG/DL
POTASSIUM SERPL-SCNC: 5.2 MMOL/L (ref 3.5–5.1)
PROT SERPL-MCNC: 7.3 G/DL (ref 6.4–8.2)
SODIUM SERPL-SCNC: 141 MMOL/L (ref 136–145)
TRIGL SERPL-MCNC: 119 MG/DL (ref 0–150)
VLDLC SERPL CALC-MCNC: 24 MG/DL
VZV IGG SER QL IA: NORMAL

## 2023-07-07 ENCOUNTER — HOSPITAL ENCOUNTER (OUTPATIENT)
Dept: WOMENS IMAGING | Age: 63
Discharge: HOME | End: 2023-07-07
Attending: FAMILY MEDICINE
Payer: COMMERCIAL

## 2023-07-07 DIAGNOSIS — Z78.0 POSTMENOPAUSAL: ICD-10-CM

## 2023-07-07 PROCEDURE — 77080 DXA BONE DENSITY AXIAL: CPT

## 2023-08-03 ENCOUNTER — HOSPITAL ENCOUNTER (OUTPATIENT)
Dept: WOMENS IMAGING | Age: 63
Discharge: HOME | End: 2023-08-03
Payer: COMMERCIAL

## 2023-08-03 DIAGNOSIS — Z12.31 VISIT FOR SCREENING MAMMOGRAM: ICD-10-CM

## 2023-08-03 PROCEDURE — 77063 BREAST TOMOSYNTHESIS BI: CPT

## 2023-08-03 NOTE — PROGRESS NOTES
[facility] Dermatology  Beckie Rodrigues MD  [facility],[address]  1960    57 y.o. female     Date of Visit: 2/16/2023    Chief Complaint: moles, f/u AK's  Chief Complaint   Patient presents with    Procedure     Excision: Right Central Back-NBCC  Curettage: Right Upper Back-SBCC     Last seen:   *recently had to have surgery - urogyn for bladder sling removed d/t eroding mesh and gen surg for removal of anchor    History of Present Illness:    Here for treatment of 2 BCCs that were biopsied at her last visit in November. Right upper back (just right of midline) with superficial BCC on pathology. Right central/mid back with nodular BCC on pathology. No problems with either site since last seen. Blood thinners, no pacemaker or implanted devices. No recent joint replacements but did have urogyn surgery as noted above. No personal hx of skin cancer. Father with hx of 2 melanomas. No other known family hx of skin cancer. She works at [employer].    Nancy Guo 14 well, good sense of health. Skin: No changing moles or lesions. Past Medical History, Family History, Surgical History, Medications and Allergies reviewed.     Past Medical History:   Diagnosis Date    Bee sting allergy     Hyperlipidemia     h/o not current    Prolonged emergence from general anesthesia     Stress incontinence        Past Surgical History:   Procedure Laterality Date    BLADDER SURGERY  2023    bladder sling removed    BLEPHAROPLASTY Bilateral 2014    Dr Evi Hull    COLONOSCOPY  09/06/2018    dr Mansih Friedman Left 2008    hammer toe repair; DR Gallo Kaminski SURGERY Left 2/10/2023    LEFT GROIN EXPLORATION performed by Soha Pitt MD at [place] [address] Bilateral     Dr Ana Barragan GASTROPLASTY  12/07/2010    Laparoscopic sleeve gastrectomy (Dr Hector Charlton)    [address] [facility] N/A 09/10/2021    CYSTOSCOPY, TRANS VAGINAL TAPING OBTERATOR performed by Faustino Lugo MD at [facility]    ganglion cyst       Outpatient Medications Marked as Taking for the 2/16/23 encounter (Procedure visit) with Juan J Davies MD   Medication Sig Dispense Refill    Biotin 10 MG CHEW Take by mouth      EPINEPHrine (EPIPEN 2-GODWIN) 0.3 MG/0.3ML SOAJ injection INJECT 0.3 INTO THE SKIN ONCE FOR UP TO ONE DOSE AS DIRECTED FOR ALLERGIC REACTION 2 each 0    Multiple Vitamin (MULTIVITAMIN PO) Take 1 tablet by mouth nightly       aspirin 81 MG EC tablet Take 81 mg by mouth nightly          Allergies   Allergen Reactions    Bee Venom Anaphylaxis    Penicillins Hives, Shortness Of Breath and Itching     All pcn related drugs    Amoxicillin Hives and Itching    Ampicillin Hives    Cefuroxime Axetil Hives and Itching         Physical Examination     Gen, well-appearing    R upper back (just R of midline) with pink macule  R mid/central back - bright pink oval macule with few fainter pink macules scattered twd midline          Assessment and Plan     R upper back (just R of midline) -superficial BCC  - Treatment options discussed with patient including risks scar and recurrence rates. Elected for curettage since small, superficial nonfacial  Curretage today -1.3 cm  Ed risk bleed, infxn, scar   Area anesthetized with lido with epi after prep with alcohol pad  Lesion treated with curettage x 3 directions with 2-3 mm margins   Hemostasis with aluminum chloride   Wound bandaged and pt educ re wnd care       R central/mid back - nod BCC  - excision today after risks reviewed and options discussed and patient consented  *Discussed that she may have other tiny foci of superficial BCC scattered toward midline but do not recommend trying to take these with the excision today as would have a much larger wound and defect to close.   Can consider curettage or field treatment with Aldara if any foci of superficial BCC at the edges of the excisional specimen.  educ risk bleed, infxn, scar   area anesth with lido with epi and prepped in sterile manner   ellipse excised to superficial SQ with 3-4 mm margins -2.1 cm  specimen to path   edges undermined and hemostasis achieved with electrodessication   wound closed with layered closure 3-0 deep vicryl sutures and Dermabond  pressure dressing applied   pt educ re wnd care and suture removal     Final repair: 4.2 cm Surgical Defect Length In Cm (Optional): 1.0

## 2023-08-10 ENCOUNTER — OFFICE VISIT (OUTPATIENT)
Dept: SURGERY | Age: 63
End: 2023-08-10
Payer: COMMERCIAL

## 2023-08-10 VITALS — WEIGHT: 168 LBS | BODY MASS INDEX: 28.39 KG/M2

## 2023-08-10 DIAGNOSIS — T83.711A EROSION OF IMPLANTED VAGINAL MESH TO SURROUNDING TISSUE, INITIAL ENCOUNTER (HCC): Primary | ICD-10-CM

## 2023-08-10 PROCEDURE — 99213 OFFICE O/P EST LOW 20 MIN: CPT | Performed by: SURGERY

## 2023-08-10 PROCEDURE — 1123F ACP DISCUSS/DSCN MKR DOCD: CPT | Performed by: SURGERY

## 2023-08-10 RX ORDER — DOXYCYCLINE HYCLATE 100 MG
100 TABLET ORAL 2 TIMES DAILY
Qty: 14 TABLET | Refills: 0 | Status: SHIPPED | OUTPATIENT
Start: 2023-08-10 | End: 2023-08-17

## 2023-08-10 NOTE — PROGRESS NOTES
Subjective:      Patient ID: An Jesus is a 70 y.o. female. HPI  S/p exploration left groin and removal of infected vaginal mesh 6 months ago. Has been on abx before for recurrent flare. States is improved with abx but has since recurred over last 10 days to 2 weeks. Review of Systems    Objective:   Physical Exam  Left groin wound with cellulitis, induration, minimal fluctuance  Assessment:       Diagnosis Orders   1. Erosion of implanted vaginal mesh to surrounding tissue, initial encounter ([address])                Plan:      Resume abx. Suspect whatever mesh is left is acting as nidus for chronic inflammation  D/W Dr. Willian Quintana. Plan co-case to re-explore groin vs vagina to remove residual mesh. Cortes Aldrich will see him in office and then we can schedule from there.               Yasmany Meneses MD

## 2023-08-30 ENCOUNTER — OFFICE VISIT (OUTPATIENT)
Dept: UROGYNECOLOGY | Age: 63
End: 2023-08-30
Payer: COMMERCIAL

## 2023-08-30 DIAGNOSIS — T85.9XXD COMPLICATION OF IMPLANTED VAGINAL MESH, UNSPECIFIED COMPLICATION, SUBSEQUENT ENCOUNTER: Primary | ICD-10-CM

## 2023-08-30 PROCEDURE — 99214 OFFICE O/P EST MOD 30 MIN: CPT | Performed by: OBSTETRICS & GYNECOLOGY

## 2023-08-30 PROCEDURE — 1123F ACP DISCUSS/DSCN MKR DOCD: CPT | Performed by: OBSTETRICS & GYNECOLOGY

## 2023-08-30 NOTE — PROGRESS NOTES
2023       HPI:     Name: Jerrod Hoffman  YOB: 1960    CC: Patient is a 70 y.o. presenting for evaluation of  mesh erosion . HPI: How long have you had this problem? Since suburethral sling was placed  Please rate the severity of your problem: moderately severe  Anything make it better? Antibiotics    Ob/Gyn History:    OB History    Para Term  AB Living       0         SAB IAB Ectopic Molar Multiple Live Births                   Past Medical History:   Past Medical History:   Diagnosis Date    Bee sting allergy     Hyperlipidemia     h/o not current    Prolonged emergence from general anesthesia     Stress incontinence      Past Surgical History:   Past Surgical History:   Procedure Laterality Date    BLADDER SURGERY      bladder sling removed    BLEPHAROPLASTY Bilateral     Dr Pb Roa    COLONOSCOPY  2018    dr Chanel Pearl Left     hammer toe repair; DR Raquel Spain SURGERY Left 2/10/2023    LEFT GROIN EXPLORATION performed by Jesus Horton MD at [facility] Bilateral     Dr Martha Foreman GASTROPLASTY  2010    Laparoscopic sleeve gastrectomy (Dr Arden Benz)    [facility] N/A 09/10/2021    CYSTOSCOPY, TRANS VAGINAL TAPING OBTERATOR performed by Akosua Walker MD at [address] [facility] Left 1992    ganglion cyst     Allergies:    Allergies   Allergen Reactions    Bee Venom Anaphylaxis    Penicillins Hives, Shortness Of Breath and Itching     All pcn related drugs    Amoxicillin Hives and Itching    Ampicillin Hives    Cefuroxime Axetil Hives and Itching     Current Medications:  Current Outpatient Medications   Medication Sig Dispense Refill    Biotin 10 MG CHEW Take by mouth      EPINEPHrine (EPIPEN 2-GODWIN) 0.3 MG/0.3ML SOAJ injection INJECT 0.3 INTO THE SKIN ONCE FOR UP TO ONE DOSE AS DIRECTED FOR ALLERGIC REACTION (Patient not taking: Reported on 2023) 2 each 0    Multiple

## 2023-09-08 ENCOUNTER — TELEPHONE (OUTPATIENT)
Dept: UROGYNECOLOGY | Age: 63
End: 2023-09-08

## 2023-09-08 NOTE — TELEPHONE ENCOUNTER
Called and left VM for patient to call office back to confirm if date of 10/23 would work for her for her surgery date with Dr. Kobe Negrete and Dr. Isi Fernandez.

## 2023-10-04 NOTE — PROGRESS NOTES
C-diff Questionnaire:     * Admitted with diarrhea? [] YES    [x]  NO     *Prior history of C-Diff. In last 3 months? [] YES    [x]  NO     *Antibiotic use in the past 6-8 weeks? []  NO    [x]  YES      If yes, which: REASON_________________     *Prior hospitalization or nursing home in the last month? []  YES    [x]  NO     SAFETY FIRST. .call before you fall    [address] time__6___        Surgery time___730__    Do not eat or drink anything after 12:00 midnight prior to your surgery. This includes water chewing gum, mints and ice chips- the Day of Surgery. You may brush your teeth and gargle the morning of your surgery, but do not swallow the water     Please see your family doctor/pediatrician for a history and physical and/or questions concerning medications. Bring any test results/reports from your physicians office. If you are under the care of a heart doctor or specialist doctor, please be aware that you may be asked to them for clearance    You may be asked to stop blood thinners such as Coumadin, Plavix, Fragmin, Lovenox, etc., or any anti-inflammatories such as:  Aspirin, Ibuprofen, Advil, Naproxen prior to your surgery. We also ask that you stop any OTC medications such as fish oil, vitamin E, glucosamine, garlic, Multivitamins, COQ 10, etc.    We ask that you do not smoke 24 hours prior to surgery  We ask that you do not  drink any alcoholic beverages 24 hours prior to surgery     You must make arrangements for a responsible adult to take you home after your surgery. For your safety you will not be allowed to leave alone or drive yourself home. Your surgery will be cancelled if you do not have a ride home. Also for your safety, it is strongly suggested that someone stay with you the first 24 hours after your surgery.      A parent or legal guardian must accompany a child scheduled for surgery and plan to stay at

## 2023-10-04 NOTE — PROGRESS NOTES
Follow Up Prior to Surgery    DOS: 10/23/23  :1960      History and Physical:  Pt states she was told to see PCP for pre op HP, she is going to [facility] MD 10/9 @220    UPDATE: Pt. Saw Dr. Rayna Haas on 10/9/23. Medically cleared.

## 2023-10-04 NOTE — PROGRESS NOTES
[facility] Pre-Admission Testing Electronic Communication Worksheet for OR/ENDO Procedures        Patient: Ahsan Martinez    DOS:  10/23    Arrival Time: 6    Surgery Time:730    Meds to Bed:  [x] YES    []  NO    Transportation Confirmed: [x] YES    []  NO    History and Physical:  [] YES    []  NO  [] N/A  If yes, please list doctor or Urgent Care and date of H&P: SEE EPIC    Additional Clearance(Cardiac, Pulmonary, etc):  [] YES    [x]  NO    Pre-Admission Testing Visit:  [] YES    [x]  NO If no, do labs/testing need to be done DOS?   [] YES    []  NO    Medication Reconciliation Complete:  [x] YES    []  NO        Additional Notes:                Interview Complete: [x] YES    []  NO          Chilo Christiansen RN  12:03 PM

## 2023-10-09 ENCOUNTER — OFFICE VISIT (OUTPATIENT)
Dept: FAMILY MEDICINE CLINIC | Age: 63
End: 2023-10-09
Payer: COMMERCIAL

## 2023-10-09 VITALS
TEMPERATURE: 97.4 F | DIASTOLIC BLOOD PRESSURE: 80 MMHG | HEIGHT: 65 IN | RESPIRATION RATE: 18 BRPM | OXYGEN SATURATION: 96 % | HEART RATE: 75 BPM | SYSTOLIC BLOOD PRESSURE: 116 MMHG | WEIGHT: 175.4 LBS | BODY MASS INDEX: 29.22 KG/M2

## 2023-10-09 DIAGNOSIS — M17.11 PRIMARY OSTEOARTHRITIS OF RIGHT KNEE: ICD-10-CM

## 2023-10-09 DIAGNOSIS — T83.711S: ICD-10-CM

## 2023-10-09 DIAGNOSIS — Z01.818 PREOP EXAMINATION: Primary | ICD-10-CM

## 2023-10-09 PROCEDURE — 1124F ACP DISCUSS-NO DSCNMKR DOCD: CPT | Performed by: FAMILY MEDICINE

## 2023-10-09 PROCEDURE — 93000 ELECTROCARDIOGRAM COMPLETE: CPT | Performed by: FAMILY MEDICINE

## 2023-10-09 PROCEDURE — 99214 OFFICE O/P EST MOD 30 MIN: CPT | Performed by: FAMILY MEDICINE

## 2023-10-09 SDOH — ECONOMIC STABILITY: FOOD INSECURITY: WITHIN THE PAST 12 MONTHS, THE FOOD YOU BOUGHT JUST DIDN'T LAST AND YOU DIDN'T HAVE MONEY TO GET MORE.: NEVER TRUE

## 2023-10-09 SDOH — ECONOMIC STABILITY: INCOME INSECURITY: HOW HARD IS IT FOR YOU TO PAY FOR THE VERY BASICS LIKE FOOD, HOUSING, MEDICAL CARE, AND HEATING?: NOT HARD AT ALL

## 2023-10-09 SDOH — ECONOMIC STABILITY: FOOD INSECURITY: WITHIN THE PAST 12 MONTHS, YOU WORRIED THAT YOUR FOOD WOULD RUN OUT BEFORE YOU GOT MONEY TO BUY MORE.: NEVER TRUE

## 2023-10-09 SDOH — ECONOMIC STABILITY: HOUSING INSECURITY
IN THE LAST 12 MONTHS, WAS THERE A TIME WHEN YOU DID NOT HAVE A STEADY PLACE TO SLEEP OR SLEPT IN A SHELTER (INCLUDING NOW)?: NO

## 2023-10-09 ASSESSMENT — ENCOUNTER SYMPTOMS
VOMITING: 0
COUGH: 0
SHORTNESS OF BREATH: 0
NAUSEA: 0
ABDOMINAL DISTENTION: 0
DIARRHEA: 0

## 2023-10-09 NOTE — PROGRESS NOTES
Subjective:   PREOPERATIVE EVALUATION     Edmund Mcnair is a 70 y.o.  female who presents to the office today for a preoperative consultation at the request of surgeon Dr. Bryce Joy and Dr. Rita Estrdaa who plan on performing Re-exploration of the groin on October 23. Duration of problem: 2 years  Assoc sx are: Irritation, growth over the incision site, rubs against clothing - not painful  Alleviating factors are: resting    This consultation is requested for the specific conditions prompting preoperative evaluation (i.e. because of potential affect on operative risk):    Diabetes: none  Coronary Artery Disease: none  COPD: none  Tobacco use? none  Recent illness? none    Other notable conditions:  None- is quite healthy. 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    The patient has the following known anesthesia issues:  none     Patient has a bleeding risk of : no recent abnormal bleeding    Patient does not have objection to receiving blood products if needed.     Past Medical History:   Diagnosis Date    Bee sting allergy     Hyperlipidemia     h/o not current    Prolonged emergence from general anesthesia     Stress incontinence      Past Surgical History:   Procedure Laterality Date    BLADDER SURGERY  2023    bladder sling removed    BLEPHAROPLASTY Bilateral 2014    Dr Gilda Ruiz    COLONOSCOPY  09/06/2018    dr Jsesica Green Left 2008    hammer toe repair; DR Ruddy Singleton SURGERY Left 2/10/2023    LEFT GROIN EXPLORATION performed by Rita Estrada MD at [facility] Bilateral     Dr Prachi Chan GASTROPLASTY  12/07/2010

## 2023-10-16 ENCOUNTER — TELEPHONE (OUTPATIENT)
Dept: UROGYNECOLOGY | Age: 63
End: 2023-10-16

## 2023-10-16 NOTE — TELEPHONE ENCOUNTER
Surgery scheduled for 10/23/23. Called patient and allowed time for any additional questions prior to surgery. Advised to stop all vitamins, herbal supplements, Aspirin, or NSAIDS the week prior to surgery. Confirmed date and time of surgery with patient as well as post op appointment. Answered all questions patient had in regards to upcoming surgery - Asked patient to call office if there are any additional questions.

## 2023-10-20 ENCOUNTER — ANESTHESIA EVENT (OUTPATIENT)
Dept: OPERATING ROOM | Age: 63
End: 2023-10-20
Payer: COMMERCIAL

## 2023-10-23 ENCOUNTER — ANESTHESIA (OUTPATIENT)
Dept: OPERATING ROOM | Age: 63
End: 2023-10-23
Payer: COMMERCIAL

## 2023-10-23 ENCOUNTER — HOSPITAL ENCOUNTER (OUTPATIENT)
Age: 63
Setting detail: OUTPATIENT SURGERY
Discharge: HOME | End: 2023-10-23
Attending: SURGERY | Admitting: SURGERY
Payer: COMMERCIAL

## 2023-10-23 VITALS
HEIGHT: 65 IN | SYSTOLIC BLOOD PRESSURE: 127 MMHG | RESPIRATION RATE: 14 BRPM | OXYGEN SATURATION: 94 % | HEART RATE: 74 BPM | BODY MASS INDEX: 28.94 KG/M2 | TEMPERATURE: 97.1 F | DIASTOLIC BLOOD PRESSURE: 65 MMHG | WEIGHT: 173.7 LBS

## 2023-10-23 DIAGNOSIS — T83.711A EROSION OF IMPLANTED VAGINAL MESH TO SURROUNDING TISSUE, INITIAL ENCOUNTER (HCC): ICD-10-CM

## 2023-10-23 DIAGNOSIS — T83.711A EROSION OF VAGINAL MESH, INITIAL ENCOUNTER (HCC): ICD-10-CM

## 2023-10-23 LAB — ACID FAST STN SPEC QL: NORMAL

## 2023-10-23 PROCEDURE — 3700000000 HC ANESTHESIA ATTENDED CARE: Performed by: SURGERY

## 2023-10-23 PROCEDURE — 2580000003 HC RX 258: Performed by: SURGERY

## 2023-10-23 PROCEDURE — 87205 SMEAR GRAM STAIN: CPT

## 2023-10-23 PROCEDURE — 7100000010 HC PHASE II RECOVERY - FIRST 15 MIN: Performed by: SURGERY

## 2023-10-23 PROCEDURE — 2580000003 HC RX 258: Performed by: ANESTHESIOLOGY

## 2023-10-23 PROCEDURE — 10121 INC&RMVL FB SUBQ TISS COMP: CPT | Performed by: SURGERY

## 2023-10-23 PROCEDURE — 87186 SC STD MICRODIL/AGAR DIL: CPT

## 2023-10-23 PROCEDURE — 87077 CULTURE AEROBIC IDENTIFY: CPT

## 2023-10-23 PROCEDURE — 6370000000 HC RX 637 (ALT 250 FOR IP): Performed by: STUDENT IN AN ORGANIZED HEALTH CARE EDUCATION/TRAINING PROGRAM

## 2023-10-23 PROCEDURE — 87070 CULTURE OTHR SPECIMN AEROBIC: CPT

## 2023-10-23 PROCEDURE — 87076 CULTURE ANAEROBE IDENT EACH: CPT

## 2023-10-23 PROCEDURE — 2500000003 HC RX 250 WO HCPCS

## 2023-10-23 PROCEDURE — 3600000014 HC SURGERY LEVEL 4 ADDTL 15MIN: Performed by: SURGERY

## 2023-10-23 PROCEDURE — 2500000003 HC RX 250 WO HCPCS: Performed by: NURSE ANESTHETIST, CERTIFIED REGISTERED

## 2023-10-23 PROCEDURE — 87102 FUNGUS ISOLATION CULTURE: CPT

## 2023-10-23 PROCEDURE — 6360000002 HC RX W HCPCS: Performed by: NURSE ANESTHETIST, CERTIFIED REGISTERED

## 2023-10-23 PROCEDURE — 3700000001 HC ADD 15 MINUTES (ANESTHESIA): Performed by: SURGERY

## 2023-10-23 PROCEDURE — 6360000002 HC RX W HCPCS: Performed by: SURGERY

## 2023-10-23 PROCEDURE — 7100000001 HC PACU RECOVERY - ADDTL 15 MIN: Performed by: SURGERY

## 2023-10-23 PROCEDURE — 2709999900 HC NON-CHARGEABLE SUPPLY: Performed by: SURGERY

## 2023-10-23 PROCEDURE — 87185 SC STD ENZYME DETCJ PER NZM: CPT

## 2023-10-23 PROCEDURE — A4217 STERILE WATER/SALINE, 500 ML: HCPCS | Performed by: SURGERY

## 2023-10-23 PROCEDURE — P9045 ALBUMIN (HUMAN), 5%, 250 ML: HCPCS | Performed by: NURSE ANESTHETIST, CERTIFIED REGISTERED

## 2023-10-23 PROCEDURE — 7100000011 HC PHASE II RECOVERY - ADDTL 15 MIN: Performed by: SURGERY

## 2023-10-23 PROCEDURE — 87206 SMEAR FLUORESCENT/ACID STAI: CPT

## 2023-10-23 PROCEDURE — 7100000000 HC PACU RECOVERY - FIRST 15 MIN: Performed by: SURGERY

## 2023-10-23 PROCEDURE — 87116 MYCOBACTERIA CULTURE: CPT

## 2023-10-23 PROCEDURE — 3600000004 HC SURGERY LEVEL 4 BASE: Performed by: SURGERY

## 2023-10-23 PROCEDURE — 2500000003 HC RX 250 WO HCPCS: Performed by: SURGERY

## 2023-10-23 RX ORDER — MAGNESIUM HYDROXIDE 1200 MG/15ML
LIQUID ORAL CONTINUOUS PRN
Status: COMPLETED | OUTPATIENT
Start: 2023-10-23 | End: 2023-10-23

## 2023-10-23 RX ORDER — MIDAZOLAM HYDROCHLORIDE 1 MG/ML
INJECTION INTRAMUSCULAR; INTRAVENOUS PRN
Status: DISCONTINUED | OUTPATIENT
Start: 2023-10-23 | End: 2023-10-23 | Stop reason: SDUPTHER

## 2023-10-23 RX ORDER — FENTANYL CITRATE 50 UG/ML
INJECTION, SOLUTION INTRAMUSCULAR; INTRAVENOUS PRN
Status: DISCONTINUED | OUTPATIENT
Start: 2023-10-23 | End: 2023-10-23 | Stop reason: SDUPTHER

## 2023-10-23 RX ORDER — LIDOCAINE HYDROCHLORIDE 20 MG/ML
INJECTION, SOLUTION EPIDURAL; INFILTRATION; INTRACAUDAL; PERINEURAL PRN
Status: DISCONTINUED | OUTPATIENT
Start: 2023-10-23 | End: 2023-10-23 | Stop reason: SDUPTHER

## 2023-10-23 RX ORDER — SODIUM CHLORIDE 9 MG/ML
INJECTION, SOLUTION INTRAVENOUS PRN
Status: DISCONTINUED | OUTPATIENT
Start: 2023-10-23 | End: 2023-10-23 | Stop reason: HOSPADM

## 2023-10-23 RX ORDER — ONDANSETRON 2 MG/ML
INJECTION INTRAMUSCULAR; INTRAVENOUS PRN
Status: DISCONTINUED | OUTPATIENT
Start: 2023-10-23 | End: 2023-10-23 | Stop reason: SDUPTHER

## 2023-10-23 RX ORDER — LABETALOL HYDROCHLORIDE 5 MG/ML
10 INJECTION, SOLUTION INTRAVENOUS
Status: DISCONTINUED | OUTPATIENT
Start: 2023-10-23 | End: 2023-10-23 | Stop reason: HOSPADM

## 2023-10-23 RX ORDER — OXYCODONE HYDROCHLORIDE AND ACETAMINOPHEN 5; 325 MG/1; MG/1
1 TABLET ORAL EVERY 6 HOURS PRN
Qty: 20 TABLET | Refills: 0 | Status: SHIPPED | OUTPATIENT
Start: 2023-10-23 | End: 2023-10-28

## 2023-10-23 RX ORDER — OXYCODONE HYDROCHLORIDE AND ACETAMINOPHEN 5; 325 MG/1; MG/1
1 TABLET ORAL PRN
Status: DISCONTINUED | OUTPATIENT
Start: 2023-10-23 | End: 2023-10-23 | Stop reason: HOSPADM

## 2023-10-23 RX ORDER — PROPOFOL 10 MG/ML
INJECTION, EMULSION INTRAVENOUS PRN
Status: DISCONTINUED | OUTPATIENT
Start: 2023-10-23 | End: 2023-10-23 | Stop reason: SDUPTHER

## 2023-10-23 RX ORDER — EPHEDRINE SULFATE/0.9% NACL/PF 50 MG/5 ML
SYRINGE (ML) INTRAVENOUS PRN
Status: DISCONTINUED | OUTPATIENT
Start: 2023-10-23 | End: 2023-10-23 | Stop reason: SDUPTHER

## 2023-10-23 RX ORDER — SODIUM CHLORIDE 0.9 % (FLUSH) 0.9 %
5-40 SYRINGE (ML) INJECTION EVERY 12 HOURS SCHEDULED
Status: DISCONTINUED | OUTPATIENT
Start: 2023-10-23 | End: 2023-10-23 | Stop reason: HOSPADM

## 2023-10-23 RX ORDER — PROCHLORPERAZINE EDISYLATE 5 MG/ML
5 INJECTION INTRAMUSCULAR; INTRAVENOUS
Status: DISCONTINUED | OUTPATIENT
Start: 2023-10-23 | End: 2023-10-23 | Stop reason: HOSPADM

## 2023-10-23 RX ORDER — IPRATROPIUM BROMIDE AND ALBUTEROL SULFATE 2.5; .5 MG/3ML; MG/3ML
1 SOLUTION RESPIRATORY (INHALATION)
Status: DISCONTINUED | OUTPATIENT
Start: 2023-10-23 | End: 2023-10-23 | Stop reason: HOSPADM

## 2023-10-23 RX ORDER — ROCURONIUM BROMIDE 10 MG/ML
INJECTION, SOLUTION INTRAVENOUS PRN
Status: DISCONTINUED | OUTPATIENT
Start: 2023-10-23 | End: 2023-10-23 | Stop reason: SDUPTHER

## 2023-10-23 RX ORDER — ALBUMIN, HUMAN INJ 5% 5 %
SOLUTION INTRAVENOUS PRN
Status: DISCONTINUED | OUTPATIENT
Start: 2023-10-23 | End: 2023-10-23 | Stop reason: SDUPTHER

## 2023-10-23 RX ORDER — GLYCOPYRROLATE 0.2 MG/ML
INJECTION INTRAMUSCULAR; INTRAVENOUS PRN
Status: DISCONTINUED | OUTPATIENT
Start: 2023-10-23 | End: 2023-10-23 | Stop reason: SDUPTHER

## 2023-10-23 RX ORDER — ONDANSETRON 2 MG/ML
4 INJECTION INTRAMUSCULAR; INTRAVENOUS
Status: DISCONTINUED | OUTPATIENT
Start: 2023-10-23 | End: 2023-10-23 | Stop reason: HOSPADM

## 2023-10-23 RX ORDER — CIPROFLOXACIN 2 MG/ML
400 INJECTION, SOLUTION INTRAVENOUS ONCE
Status: COMPLETED | OUTPATIENT
Start: 2023-10-23 | End: 2023-10-23

## 2023-10-23 RX ORDER — SODIUM CHLORIDE 0.9 % (FLUSH) 0.9 %
5-40 SYRINGE (ML) INJECTION PRN
Status: DISCONTINUED | OUTPATIENT
Start: 2023-10-23 | End: 2023-10-23 | Stop reason: HOSPADM

## 2023-10-23 RX ORDER — DEXAMETHASONE SODIUM PHOSPHATE 4 MG/ML
INJECTION, SOLUTION INTRA-ARTICULAR; INTRALESIONAL; INTRAMUSCULAR; INTRAVENOUS; SOFT TISSUE PRN
Status: DISCONTINUED | OUTPATIENT
Start: 2023-10-23 | End: 2023-10-23 | Stop reason: SDUPTHER

## 2023-10-23 RX ORDER — HYDRALAZINE HYDROCHLORIDE 20 MG/ML
10 INJECTION INTRAMUSCULAR; INTRAVENOUS
Status: DISCONTINUED | OUTPATIENT
Start: 2023-10-23 | End: 2023-10-23 | Stop reason: HOSPADM

## 2023-10-23 RX ORDER — LIDOCAINE HYDROCHLORIDE AND EPINEPHRINE 10; 10 MG/ML; UG/ML
INJECTION, SOLUTION INFILTRATION; PERINEURAL
Status: COMPLETED | OUTPATIENT
Start: 2023-10-23 | End: 2023-10-23

## 2023-10-23 RX ORDER — PHENYLEPHRINE HCL IN 0.9% NACL 1 MG/10 ML
SYRINGE (ML) INTRAVENOUS PRN
Status: DISCONTINUED | OUTPATIENT
Start: 2023-10-23 | End: 2023-10-23 | Stop reason: SDUPTHER

## 2023-10-23 RX ADMIN — Medication 10 MG: at 08:38

## 2023-10-23 RX ADMIN — Medication 200 MCG: at 08:25

## 2023-10-23 RX ADMIN — CIPROFLOXACIN 400 MG: 2 INJECTION, SOLUTION INTRAVENOUS at 07:30

## 2023-10-23 RX ADMIN — ROCURONIUM BROMIDE 50 MG: 10 INJECTION, SOLUTION INTRAVENOUS at 07:33

## 2023-10-23 RX ADMIN — DEXAMETHASONE SODIUM PHOSPHATE 8 MG: 4 INJECTION, SOLUTION INTRAMUSCULAR; INTRAVENOUS at 07:56

## 2023-10-23 RX ADMIN — Medication 200 MCG: at 08:05

## 2023-10-23 RX ADMIN — SUGAMMADEX 300 MG: 100 INJECTION, SOLUTION INTRAVENOUS at 09:22

## 2023-10-23 RX ADMIN — Medication 200 MCG: at 08:15

## 2023-10-23 RX ADMIN — SODIUM CHLORIDE: 9 INJECTION, SOLUTION INTRAVENOUS at 06:29

## 2023-10-23 RX ADMIN — HYDROMORPHONE HYDROCHLORIDE 0.5 MG: 1 INJECTION, SOLUTION INTRAMUSCULAR; INTRAVENOUS; SUBCUTANEOUS at 09:13

## 2023-10-23 RX ADMIN — MIDAZOLAM 2 MG: 1 INJECTION INTRAMUSCULAR; INTRAVENOUS at 07:25

## 2023-10-23 RX ADMIN — ALBUMIN (HUMAN) 250 ML: 12.5 SOLUTION INTRAVENOUS at 08:32

## 2023-10-23 RX ADMIN — SODIUM CHLORIDE: 9 INJECTION, SOLUTION INTRAVENOUS at 08:44

## 2023-10-23 RX ADMIN — FENTANYL CITRATE 100 MCG: 50 INJECTION INTRAMUSCULAR; INTRAVENOUS at 07:32

## 2023-10-23 RX ADMIN — LIDOCAINE HYDROCHLORIDE 80 MG: 20 INJECTION, SOLUTION EPIDURAL; INFILTRATION; INTRACAUDAL; PERINEURAL at 07:32

## 2023-10-23 RX ADMIN — ROCURONIUM BROMIDE 20 MG: 10 INJECTION, SOLUTION INTRAVENOUS at 08:34

## 2023-10-23 RX ADMIN — ONDANSETRON 4 MG: 2 INJECTION INTRAMUSCULAR; INTRAVENOUS at 07:56

## 2023-10-23 RX ADMIN — GLYCOPYRROLATE 0.2 MG: 0.2 INJECTION INTRAMUSCULAR; INTRAVENOUS at 07:50

## 2023-10-23 RX ADMIN — PROPOFOL 30 MG: 10 INJECTION, EMULSION INTRAVENOUS at 07:33

## 2023-10-23 RX ADMIN — OXYCODONE AND ACETAMINOPHEN 1 TABLET: 5; 325 TABLET ORAL at 10:28

## 2023-10-23 RX ADMIN — PROPOFOL 100 MG: 10 INJECTION, EMULSION INTRAVENOUS at 07:32

## 2023-10-23 ASSESSMENT — PAIN - FUNCTIONAL ASSESSMENT
PAIN_FUNCTIONAL_ASSESSMENT: 0-10
PAIN_FUNCTIONAL_ASSESSMENT: ACTIVITIES ARE NOT PREVENTED
PAIN_FUNCTIONAL_ASSESSMENT: PREVENTS OR INTERFERES SOME ACTIVE ACTIVITIES AND ADLS
PAIN_FUNCTIONAL_ASSESSMENT: NONE - DENIES PAIN
PAIN_FUNCTIONAL_ASSESSMENT: ACTIVITIES ARE NOT PREVENTED
PAIN_FUNCTIONAL_ASSESSMENT: ACTIVITIES ARE NOT PREVENTED

## 2023-10-23 ASSESSMENT — PAIN DESCRIPTION - ONSET: ONSET: ON-GOING

## 2023-10-23 ASSESSMENT — PAIN DESCRIPTION - LOCATION
LOCATION: GROIN

## 2023-10-23 ASSESSMENT — PAIN SCALES - GENERAL
PAINLEVEL_OUTOF10: 4
PAINLEVEL_OUTOF10: 6
PAINLEVEL_OUTOF10: 6
PAINLEVEL_OUTOF10: 5

## 2023-10-23 ASSESSMENT — PAIN DESCRIPTION - DESCRIPTORS
DESCRIPTORS: DISCOMFORT

## 2023-10-23 ASSESSMENT — PAIN DESCRIPTION - PAIN TYPE
TYPE: SURGICAL PAIN

## 2023-10-23 ASSESSMENT — PAIN DESCRIPTION - FREQUENCY
FREQUENCY: CONTINUOUS
FREQUENCY: CONTINUOUS

## 2023-10-23 ASSESSMENT — PAIN DESCRIPTION - ORIENTATION
ORIENTATION: LEFT

## 2023-10-23 ASSESSMENT — LIFESTYLE VARIABLES: SMOKING_STATUS: 0

## 2023-10-23 NOTE — H&P
Update History & Physical    The patient's History and Physical of October 9, 2023 was reviewed with the patient and I examined the patient. There was no change. Plan left groin and vaginal exploration, removal infected vaginal mesh. The surgical site was confirmed by the patient and me. Plan: The risks, benefits, expected outcome, and alternative to the recommended procedure have been discussed with the patient. Patient understands and wants to proceed with the procedure.      Electronically signed by Etta Manriquez MD on 10/23/2023 at 7:15 AM

## 2023-10-23 NOTE — OP NOTE
[address], [facility] [facility] [place] [address]                                OPERATIVE REPORT    PATIENT NAME: Campbell Martinez                     :        1960  MED REC NO:   [id number]                          ROOM:  ACCOUNT NO:   [de-identified]                           ADMIT DATE: 10/23/2023  PROVIDER:     Maddie Alvarez MD    DATE OF PROCEDURE:  10/23/2023    PREOPERATIVE DIAGNOSIS:  Erosion of implanted vaginal mesh. POSTOPERATIVE DIAGNOSIS:  Erosion of implanted vaginal mesh. OPERATION PERFORMED:  1. Left groin exploration with removal of vaginal mesh. 2.  Vaginal exploration with removal of vaginal mesh by Dr. Joseph Luna. SURGEON:  Maddie Alvarez MD    CO-SURGEON:  Yoly Brunson MD    INTRAOPERATIVE CONSULTATION:  Re Cantor MD    ANESTHESIA:  General endotracheal anesthesia. ASA CLASS:  II.    ESTIMATED BLOOD LOSS:  Less than 50 mL. SPECIMEN:  Implanted vaginal mesh for gross evaluation as well as  culture. INDICATIONS:  The patient is a 58-year-old female who has had a previous  mesh sling placed by her gynecologist.  She had erosion of this into the  vagina that was removed by Dr. Joseph Luna in a previous date and then also  removed a piece of this from the left groin due to infection as well. She did recover fairly well; however, she has had multiple recurrence of  infections at the previous incision site in the left groin. They  improved with antibiotics, but she now want to be on long-term  antibiotics and this was also discussed with Dr. Joseph Luna again about tag  team approach of vaginal and reexploration of the groin with removable  of remainder of mesh. Risks of bleeding, infection, anesthesia, risk of  injuring surrounding structures as well as chronic inflammation were  discussed at length and she was agreeable to proceed.     OPERATIVE PROCEDURE:  The patient was brought to the operating suite

## 2023-10-23 NOTE — BRIEF OP NOTE
Brief Postoperative Note      Patient: Edmund Mcnair  YOB: 1960  MRN: [id number]    Date of Procedure: 10/23/2023    Pre-Op Diagnosis Codes:     * Erosion of implanted vaginal mesh to surrounding tissue, initial encounter ([address]) [T83.711A]     * Erosion of vaginal mesh, initial encounter ([address]) Loyde Ormond    Post-Op Diagnosis: Same       Procedure(s):  RE-EXPLORATION OF GROIN  VAGINAL EXPLORATION WITH REMOVAL OF VAGINAL MESH    Surgeon(s):  MD Rita Harris MD Marvin Memos, MD    Assistant:  Surgical Assistant: Stepan De Guzman    Anesthesia: General    Estimated Blood Loss (mL): less than 50     Complications: None    Specimens:   ID Type Source Tests Collected by Time Destination   1 : 1.  Suburethral Sling , culture and gross only Tissue Tissue CULTURE, FUNGUS, CULTURE, TISSUE, CULTURE WITH SMEAR, ACID FAST Aurelio High MD 10/23/2023 5956        Implants:  * No implants in log *      Drains: * No LDAs found *    Findings: mesh densely adhered to posterior aspect pubic rami  This procedure was not performed to treat primary cutaneous melanoma through wide local excision      Electronically signed by Rita Estrada MD on 10/23/2023 at 9:23 AM

## 2023-10-23 NOTE — PROGRESS NOTES
Phoned Dr. Cephus Gilford to inform that patient was requesting to go home with pain medication. Per Dr. Cephus Gilford, he will place new orders.

## 2023-10-23 NOTE — ANESTHESIA POSTPROCEDURE EVALUATION
Department of Anesthesiology  Postprocedure Note    Patient: Johnathan Wright  MRN: [id number]  YOB: 1960  Date of evaluation: 10/23/2023      Procedure Summary       Date: 10/23/23 Room / Location: [facility] [place] OR [address] [place] [address]    Anesthesia Start: 7304 Anesthesia Stop: 0940    Procedures:       RE-EXPLORATION OF GROIN (Groin)      VAGINAL EXPLORATION WITH REMOVAL OF VAGINAL MESH (Vagina ) Diagnosis:       Erosion of implanted vaginal mesh to surrounding tissue, initial encounter ([address])      Erosion of vaginal mesh, initial encounter ([address])      (Erosion of implanted vaginal mesh to surrounding tissue, initial encounter ([address]) William Rear)      (Erosion of vaginal mesh, initial encounter ([address]) William Rear)    Surgeons: Alfredo Scales MD; Hoda Cheung MD Responsible Provider: Pancho Mckay MD    Anesthesia Type: general ASA Status: 2            Anesthesia Type: General    Eladio Phase I: Eladio Score: 8    Eladio Phase II: Eladio Score: 10      Anesthesia Post Evaluation    Patient location during evaluation: PACU  Patient participation: complete - patient participated  Level of consciousness: awake and alert  Airway patency: patent  Nausea & Vomiting: no nausea and no vomiting  Complications: no  Cardiovascular status: hemodynamically stable and blood pressure returned to baseline  Respiratory status: spontaneous ventilation, nonlabored ventilation and room air  Hydration status: stable  Comments: Technically difficult procedure. Ms. Solange Matt reports appropriate incisional soreness in PACU. Appropriate for return to [facility] for planned discharge home with .    Pain management: adequate

## 2023-10-24 PROBLEM — L90.5: Status: ACTIVE | Noted: 2023-10-24

## 2023-10-24 LAB — LOEFFLER MB STN SPEC: NORMAL

## 2023-10-24 NOTE — OP NOTE
[address], [facility] [address]                                OPERATIVE REPORT    PATIENT NAME: Morena Alexandria                     :        1960  MED REC NO:   [id number]                          ROOM:  ACCOUNT NO:   [de-identified]                           ADMIT DATE: 10/23/2023  PROVIDER:     Dillon Velasco MD    DATE OF PROCEDURE:  10/23/2023    PRIMARY CARE PHYSICIAN:  Enrique Chamorro MD    PREOPERATIVE DIAGNOSIS:  Left groin scar tissue from previous mesh  placement. POSTOPERATIVE DIAGNOSIS:  Left groin scar tissue from previous mesh  placement. OPERATION PERFORMED:  Fasciotomy left hip/groin area with exploration  and removal of foreign body/mesh. SURGEON:  Dillon Velasco MD    ANESTHESIA:  General anesthesia. ESTIMATED BLOOD LOSS:  Minimal.    COMPLICATIONS:  None. INDICATIONS:  This is a 61-year-old white female who has a previous  surgery by her gynecologist with a sling mesh in the past.  She had the  mesh eroded into vagina and she had previous surgery by Dr. Mariah Meadows as  well as Dr. Claribel Dumont in the groin area. She then had a mesh with a sinus  tract in the vagina. She was scheduled for exploration and removal of  the mesh and I was consulted intraoperatively for a groin fasciotomy and  to help with the area as this was more arthritic area. All risks,  benefits, and alternatives were discussed previously with the patient by  Dr. Claribel Dumont and she had consent signed. OPERATIVE PROCEDURE:  The patient already had general anesthesia. The  surgery was started by Dr. Mariah Meadows and Dr. Claribel Dumont. She had an incision  made on the vaginal side on the left side as well as in the perineal  area. Upon inspection, there was a significant scar tissue of the  fascia on the perineal area.   Dissection was performed using Branford  scissors, fasciotomy was done and removal of the scar tissue carefully  to the area of the

## 2023-10-25 DIAGNOSIS — T83.711A EROSION OF IMPLANTED VAGINAL MESH TO SURROUNDING TISSUE, INITIAL ENCOUNTER (HCC): Primary | ICD-10-CM

## 2023-10-25 RX ORDER — DOXYCYCLINE HYCLATE 100 MG
100 TABLET ORAL 2 TIMES DAILY
Qty: 14 TABLET | Refills: 0 | Status: SHIPPED | OUTPATIENT
Start: 2023-10-25 | End: 2023-11-01

## 2023-10-27 LAB
BACTERIA SPEC AEROBE CULT: ABNORMAL
BACTERIA SPEC ANAEROBE CULT: ABNORMAL
BACTERIA SPEC ANAEROBE CULT: ABNORMAL
GRAM STN SPEC: ABNORMAL
ORGANISM: ABNORMAL

## 2023-10-27 NOTE — OP NOTE
Operative Note      Patient: Duane Bland  YOB: 1960  MRN: [id number]    Date of Procedure: 10/23/2023    Pre-Op Diagnosis: Erosion of implanted vaginal mesh to surrounding tissue, initial encounter ([address]) [T83.711A]  Erosion of vaginal mesh, initial encounter ([address]) Sukhdeep Salmeron    Post-Op Diagnosis: Same       Procedures: Procedure(s):  RE-EXPLORATION OF GROIN  VAGINAL EXPLORATION WITH REMOVAL OF VAGINAL MESH     Surgeon: Surgeon(s):  MD Cruzito Stone MD Atilano Filippo, MD    Anesthesia: General    Assistant: Surgical Assistant: Hue Lorenzo    Estimated Blood Loss (mL): 20    IV FLUIDS: NA milliliter(s) No intake/output data recorded. URINE OUTPUT: NA milliters(s)   Output by Drain (mL) 10/25/23 0701 - 10/25/23 1900 10/25/23 1901 - 10/26/23 0700 10/26/23 0701 - 10/26/23 1900 10/26/23 1901 - 10/27/23 0700 10/27/23 0701 - 10/27/23 1120   Patient has no LDAs of requested type attached. Complications: None    Specimens:   ID Type Source Tests Collected by Time Destination   1 : 1. Suburethral Sling , culture and gross only Tissue Tissue CULTURE, FUNGUS, CULTURE, TISSUE, CULTURE WITH SMEAR, ACID FAST Yanelis Berry MD 10/23/2023 6106        Implants: * No implants in log *      Drains: * No LDAs found *    FINDINGS: Transobturator mesh removed from the patient's groin and vagina. There was fullness and pressure located throughout the groin area    INDICATION FOR PROCEDURE: 70y.o. year old patient who presented with continued drainage and infection of her left groin. She recently had a TVT OT placed and I originally attempted to remove the sling from the area that was affected in her groin. I removed part of the sling but it came back. I then send her to general surgery who also removed about 2 inches of material from the groin however her symptoms returned again.   We spoke with her about a joint case where we would use a

## 2023-10-31 LAB
ACID FAST STN SPEC QL: NORMAL
MYCOBACTERIUM SPEC CULT: NORMAL

## 2023-11-06 LAB
FUNGUS SPEC CULT: NORMAL
LOEFFLER MB STN SPEC: NORMAL

## 2023-11-07 ENCOUNTER — OFFICE VISIT (OUTPATIENT)
Dept: UROGYNECOLOGY | Age: 63
End: 2023-11-07

## 2023-11-07 VITALS
SYSTOLIC BLOOD PRESSURE: 123 MMHG | DIASTOLIC BLOOD PRESSURE: 80 MMHG | OXYGEN SATURATION: 99 % | HEART RATE: 70 BPM | RESPIRATION RATE: 16 BRPM | TEMPERATURE: 98.5 F

## 2023-11-07 DIAGNOSIS — Z09 POSTOP CHECK: Primary | ICD-10-CM

## 2023-11-07 LAB
ACID FAST STN SPEC QL: NORMAL
MYCOBACTERIUM SPEC CULT: NORMAL

## 2023-11-07 PROCEDURE — 99024 POSTOP FOLLOW-UP VISIT: CPT | Performed by: NURSE PRACTITIONER

## 2023-11-07 NOTE — PROGRESS NOTES
11/7/2023     HPI:     Name: Monie Mills   YOB: 1960    CC: Monie Mills is a 70 y.o. female who is here for a 2 week post op evaluation. HPI: Recently underwent Vaginal exploration with removal of vaginal mesh and re-exploration of groin on 10/23/2023.     Voiding difficulty: No  Initiating stream: No  Force stream: No  Lean forward to empty: No  Slow/intermittent stream: No  Unable to empty bladder: No  Incontinence: no   Urgency without incontinence: No   Frequency without incontinence: No   Bowel functions: normal   Pain Controlled: Yes    Past Medical History:   Past Medical History:   Diagnosis Date    Bee sting allergy     Hyperlipidemia     h/o not current    Prolonged emergence from general anesthesia     Stress incontinence      Past Surgical History:   Past Surgical History:   Procedure Laterality Date    BLADDER SURGERY  2023    bladder sling removed    BLEPHAROPLASTY Bilateral 2014    Dr Janice Barksdale    COLONOSCOPY  09/06/2018    dr Dillon Chao Left 2008    hammer toe repair; DR Kulwant Rowe SURGERY Left 2/10/2023    LEFT GROIN EXPLORATION performed by Etta Manriquez MD at [id number] 10/23/2023    RE-EXPLORATION OF GROIN performed by Etta Manriquez MD at [address] [facility] Bilateral     Dr Charlie Khan GASTROPLASTY  12/07/2010    Laparoscopic sleeve gastrectomy (Dr Cassie Esparza)    Arbenhuma N/A 09/10/2021    CYSTOSCOPY, TRANS VAGINAL TAPING OBTERATOR performed by Maris Solares MD at [facility] 10/23/2023    VAGINAL EXPLORATION WITH REMOVAL OF VAGINAL MESH performed by Jia Jackman MD at [address] [facility] Left 1992    ganglion cyst     Current Medications:  Current Outpatient Medications   Medication Sig Dispense Refill    Biotin 10 MG CHEW Take by mouth      Multiple Vitamin (MULTIVITAMIN PO) Take 1 tablet by mouth nightly        No current facility-administered medications for

## 2023-11-13 LAB
FUNGUS SPEC CULT: NORMAL
LOEFFLER MB STN SPEC: NORMAL

## 2023-11-14 LAB
ACID FAST STN SPEC QL: NORMAL
MYCOBACTERIUM SPEC CULT: NORMAL

## 2023-11-20 LAB
FUNGUS SPEC CULT: NORMAL
LOEFFLER MB STN SPEC: NORMAL

## 2023-11-21 LAB
ACID FAST STN SPEC QL: NORMAL
MYCOBACTERIUM SPEC CULT: NORMAL

## 2023-11-27 LAB
FUNGUS SPEC CULT: NORMAL
LOEFFLER MB STN SPEC: NORMAL

## 2023-11-28 LAB
ACID FAST STN SPEC QL: NORMAL
MYCOBACTERIUM SPEC CULT: NORMAL

## 2023-11-30 ENCOUNTER — OFFICE VISIT (OUTPATIENT)
Dept: DERMATOLOGY | Age: 63
End: 2023-11-30

## 2023-11-30 DIAGNOSIS — L57.0 AK (ACTINIC KERATOSIS): ICD-10-CM

## 2023-11-30 DIAGNOSIS — L82.1 SEBORRHEIC KERATOSIS: ICD-10-CM

## 2023-11-30 DIAGNOSIS — Z80.8 FAMILY HISTORY OF MALIGNANT MELANOMA: ICD-10-CM

## 2023-11-30 DIAGNOSIS — Z85.828 HISTORY OF NONMELANOMA SKIN CANCER: Primary | ICD-10-CM

## 2023-11-30 DIAGNOSIS — L57.8 DIFFUSE PHOTODAMAGE OF SKIN: ICD-10-CM

## 2023-11-30 DIAGNOSIS — D22.9 MULTIPLE NEVI: ICD-10-CM

## 2023-11-30 DIAGNOSIS — L81.4 LENTIGINES: ICD-10-CM

## 2023-11-30 RX ORDER — FLUOROURACIL 50 MG/G
CREAM TOPICAL
Qty: 40 G | Refills: 1 | Status: SHIPPED | OUTPATIENT
Start: 2023-11-30

## 2023-11-30 NOTE — PROGRESS NOTES
sleeve gastrectomy (Dr Batsheva Allen)    Zenon N/A 09/10/2021    CYSTOSCOPY, TRANS VAGINAL TAPING OBTERATOR performed by Raquel Novoa MD at [address] 10/23/2023    VAGINAL EXPLORATION WITH REMOVAL OF VAGINAL MESH performed by Chester Nelson MD at [address] [facility] [address] Left 1992    ganglion cyst       Outpatient Medications Marked as Taking for the 11/30/23 encounter (Office Visit) with Anam Pérez MD   Medication Sig Dispense Refill    Biotin 10 MG CHEW Take by mouth      Multiple Vitamin (MULTIVITAMIN PO) Take 1 tablet by mouth nightly          Allergies   Allergen Reactions    Bee Venom Anaphylaxis    Penicillins Hives, Shortness Of Breath and Itching     All pcn related drugs    Amoxicillin Hives and Itching    Ampicillin Hives    Cefuroxime Axetil Hives and Itching         Physical Examination     Gen, well-appearing  FSE today except for underwear-covered areas    Scars clear  trunk and extremities with scattered brown macules and papules   Chest and L FH with erythematous roughly scaled macules          Assessment and Plan     1. Hx of NMSC, no signs recurrence  - Monitor for ABCD's of MM and si/sx of NMSC  Continue sun protection - OTC sunscreen with SPF 30-50+ recommended and reviewed usage  Encouraged skin check yearly (sooner if indicated), self checks    2. Benign-appearing nevi, SK's and lentigines  2b. family hx of melanoma   - Monitor for ABCD's of MM and si/sx of NMSC  Continue sun protection - OTC sunscreen with SPF 30-50+ recommended and reviewed usage  Encouraged skin check yearly (sooner if indicated), self checks    3.  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

## 2023-12-05 LAB
ACID FAST STN SPEC QL: NORMAL
MYCOBACTERIUM SPEC CULT: NORMAL

## 2024-06-14 ASSESSMENT — PATIENT HEALTH QUESTIONNAIRE - PHQ9
SUM OF ALL RESPONSES TO PHQ QUESTIONS 1-9: 0
SUM OF ALL RESPONSES TO PHQ QUESTIONS 1-9: 0
2. FEELING DOWN, DEPRESSED OR HOPELESS: NOT AT ALL
SUM OF ALL RESPONSES TO PHQ QUESTIONS 1-9: 0
1. LITTLE INTEREST OR PLEASURE IN DOING THINGS: NOT AT ALL
1. LITTLE INTEREST OR PLEASURE IN DOING THINGS: NOT AT ALL
SUM OF ALL RESPONSES TO PHQ9 QUESTIONS 1 & 2: 0
SUM OF ALL RESPONSES TO PHQ9 QUESTIONS 1 & 2: 0
2. FEELING DOWN, DEPRESSED OR HOPELESS: NOT AT ALL
SUM OF ALL RESPONSES TO PHQ QUESTIONS 1-9: 0

## 2024-06-17 ENCOUNTER — OFFICE VISIT (OUTPATIENT)
Dept: FAMILY MEDICINE CLINIC | Age: 64
End: 2024-06-17

## 2024-06-17 VITALS
DIASTOLIC BLOOD PRESSURE: 80 MMHG | SYSTOLIC BLOOD PRESSURE: 112 MMHG | HEART RATE: 79 BPM | OXYGEN SATURATION: 96 % | TEMPERATURE: 98 F | BODY MASS INDEX: 27.96 KG/M2 | RESPIRATION RATE: 18 BRPM | HEIGHT: 65 IN | WEIGHT: 167.8 LBS

## 2024-06-17 DIAGNOSIS — Z98.84 HISTORY OF GASTRIC BYPASS: ICD-10-CM

## 2024-06-17 DIAGNOSIS — E55.9 VITAMIN D DEFICIENCY: ICD-10-CM

## 2024-06-17 DIAGNOSIS — Z91.030 BEE STING ALLERGY: ICD-10-CM

## 2024-06-17 DIAGNOSIS — N95.0 POSTMENOPAUSAL BLEEDING: Primary | ICD-10-CM

## 2024-06-17 RX ORDER — EPINEPHRINE 0.3 MG/.3ML
INJECTION SUBCUTANEOUS
Qty: 2 EACH | Refills: 0 | Status: SHIPPED | OUTPATIENT
Start: 2024-06-17

## 2024-06-17 NOTE — PROGRESS NOTES
2024    Blood pressure 112/80, pulse 79, temperature 98 °F (36.7 °C), temperature source Oral, resp. rate 18, height 1.651 m (5' 5\"), weight 76.1 kg (167 lb 12.8 oz), last menstrual period 11/15/2010, SpO2 96 %, not currently breastfeeding.    Lesly Naranjo (:  1960) is a 64 y.o. female, here for evaluation of the following medical concerns:    Chief Complaint   Patient presents with    Annual Exam     X3 in last 3 months when wiping after urination she has spotting.  She does have a bladder sling removed last year.  Hair loss.  Needs a refill on Epi pen     Here for concer of urine difficulties. A spot of BRB with wiping after urination. This has happened 3x in the past 3 mo.  No urine freq.  Sometimes has pelvic cramping.  Last pap done by Dr jackson- unsure of date.  She has had urologic surgery with DR Emery, removing a suprapubic sling due to erosion.  Denies dysuria or frequency.    She is unsure of her last pap.  No vag bleeding.  No rectal bleeding or pain with passing BM.  No hx stones.  No f/c/ns.    Has medicare- does not need referral for urogynocylogist.    Hx bee sting allergy- anaphylaxis.  Needs epi pen refill.    No daily meds.    She does have hx vit D deficiency does not take supplemental.   Last vit D level done .  She does not drink milk.  DEXA normal last year.    Last lipid test:  Lab Results   Component Value Date    CHOL 236 (H) 2023    TRIG 119 2023    HDL 80 (H) 2023     Lab Results   Component Value Date    ALT 16 2023    AST 22 2023     Lab Results   Component Value Date     (H) 2023     The 10-year ASCVD risk score (Carolee QUINTANILLA, et al., 2019) is: 4.5%    Values used to calculate the score:      Age: 64 years      Sex: Female      Is Non- : No      Diabetic: No      Tobacco smoker: No      Systolic Blood Pressure: 112 mmHg      Is BP treated: No      HDL Cholesterol: 80 mg/dL      Total Cholesterol:  "       Date: 2024   Patient Name: Yunior Mead Jr.  : 1959   MRN: [id number]     Chief Complaint:    Chief Complaint   Patient presents with   • Establish Care   • Error       History of Present Illness: Yunior Mead Jr. is a 64 y.o. male who is here today for HPI         Review of Systems:   Review of Systems    Past Medical History: No past medical history on file.    Past Surgical History: No past surgical history on file.    Family History: No family history on file.    Social History:   Social History     Socioeconomic History   • Marital status:    Tobacco Use   • Smoking status: Never   • Smokeless tobacco: Never   Vaping Use   • Vaping status: Never Used   Substance and Sexual Activity   • Alcohol use: Yes   • Drug use: Never   • Sexual activity: Yes     Partners: Female       Medications:     Current Outpatient Medications:   •  diphenhydrAMINE HCl (BENADRYL ALLERGY PO), Take  by mouth., Disp: , Rfl:   •  Garlic 1000 MG capsule, Take  by mouth., Disp: , Rfl:   •  Moringa 500 MG capsule, Take  by mouth., Disp: , Rfl:   •  Turmeric 500 MG capsule, Take  by mouth., Disp: , Rfl:   •  Omega-3 Fatty Acids (fish oil) 1000 MG capsule capsule, Take 1 capsule by mouth Daily With Breakfast., Disp: , Rfl:     Allergies:   Allergies   Allergen Reactions   • Penicillins Hives         Physical Exam:  Vital Signs:   Vitals:    24 0955   BP: 140/88   BP Location: Left arm   Patient Position: Sitting   Cuff Size: Adult   Pulse: 81   SpO2: 96%   Weight: 102 kg (224 lb 8 oz)   Height: 193 cm (76\")     Body mass index is 27.33 kg/m².     Physical Exam      Assessment/Plan:   Diagnoses and all orders for this visit:    1. ERRONEOUS ENCOUNTER--DISREGARD (Primary)           Follow Up:   No follow-ups on file.    Valeria Osuna DO  [facility]    This encounter was created in error - please disregard.  "

## 2024-06-26 ENCOUNTER — OFFICE VISIT (OUTPATIENT)
Dept: UROGYNECOLOGY | Age: 64
End: 2024-06-26
Payer: MEDICARE

## 2024-06-26 VITALS
RESPIRATION RATE: 16 BRPM | OXYGEN SATURATION: 96 % | SYSTOLIC BLOOD PRESSURE: 118 MMHG | HEART RATE: 67 BPM | DIASTOLIC BLOOD PRESSURE: 86 MMHG | TEMPERATURE: 97.6 F

## 2024-06-26 DIAGNOSIS — N95.0 PMB (POSTMENOPAUSAL BLEEDING): Primary | ICD-10-CM

## 2024-06-26 PROCEDURE — 99214 OFFICE O/P EST MOD 30 MIN: CPT | Performed by: OBSTETRICS & GYNECOLOGY

## 2024-06-26 PROCEDURE — 1124F ACP DISCUSS-NO DSCNMKR DOCD: CPT | Performed by: OBSTETRICS & GYNECOLOGY

## 2024-06-26 NOTE — PROGRESS NOTES
Sig Dispense Refill    EPINEPHrine (EPIPEN 2-GODWIN) 0.3 MG/0.3ML SOAJ injection INJECT 0.3 INTO THE SKIN ONCE FOR UP TO ONE DOSE AS DIRECTED FOR ALLERGIC REACTION 2 each 0    Multiple Vitamin (MULTIVITAMIN PO) Take 1 tablet by mouth nightly        No current facility-administered medications for this visit.     Social History:   Social History     Socioeconomic History    Marital status:      Spouse name: Serge    Number of children: 0    Years of education: Not on file    Highest education level: Not on file   Occupational History    Occupation: RN     Employer: [employer]   Tobacco Use    Smoking status: Never    Smokeless tobacco: Never   Vaping Use    Vaping Use: Never used   Substance and Sexual Activity    Alcohol use: Yes     Comment: on weekends, 2-3 drinks    Drug use: Never    Sexual activity: Yes     Partners: Male     Comment:  Serge   Other Topics Concern    Not on file   Social History Narrative    Lives with , 1 dog     Social Determinants of Health     Financial Resource Strain: Low Risk  (10/9/2023)    Overall Financial Resource Strain (CARDIA)     Difficulty of Paying Living Expenses: Not hard at all   Food Insecurity: Not on file (10/9/2023)   Transportation Needs: Unknown (10/9/2023)    PRAPARE - Transportation     Lack of Transportation (Medical): Not on file     Lack of Transportation (Non-Medical): No   Physical Activity: Not on file   Stress: Not on file   Social Connections: Not on file   Intimate Partner Violence: Not on file   Housing Stability: Unknown (10/9/2023)    Housing Stability Vital Sign     Unable to Pay for Housing in the Last Year: Not on file     Number of Places Lived in the Last Year: Not on file     Unstable Housing in the Last Year: No     Family History:   Family History   Problem Relation Age of Onset    Cancer Father         skin         Objective:     Vitals  Vitals:    06/26/24 1452 06/26/24 1453   BP: (!) 124/91 118/86   Pulse: 67

## 2024-06-28 ENCOUNTER — HOSPITAL ENCOUNTER (OUTPATIENT)
Dept: ULTRASOUND IMAGING | Age: 64
Discharge: HOME | End: 2024-06-28
Payer: MEDICARE

## 2024-06-28 DIAGNOSIS — N95.0 PMB (POSTMENOPAUSAL BLEEDING): ICD-10-CM

## 2024-06-28 PROCEDURE — 76830 TRANSVAGINAL US NON-OB: CPT

## 2024-06-28 PROCEDURE — 76856 US EXAM PELVIC COMPLETE: CPT

## 2024-08-14 ENCOUNTER — HOSPITAL ENCOUNTER (OUTPATIENT)
Dept: WOMENS IMAGING | Age: 64
Discharge: HOME | End: 2024-08-14
Payer: MEDICARE

## 2024-08-14 DIAGNOSIS — Z12.31 VISIT FOR SCREENING MAMMOGRAM: ICD-10-CM

## 2024-08-14 PROCEDURE — 77063 BREAST TOMOSYNTHESIS BI: CPT

## 2024-08-19 SDOH — HEALTH STABILITY: PHYSICAL HEALTH: ON AVERAGE, HOW MANY DAYS PER WEEK DO YOU ENGAGE IN MODERATE TO STRENUOUS EXERCISE (LIKE A BRISK WALK)?: 6 DAYS

## 2024-08-19 SDOH — HEALTH STABILITY: PHYSICAL HEALTH: ON AVERAGE, HOW MANY MINUTES DO YOU ENGAGE IN EXERCISE AT THIS LEVEL?: 60 MIN

## 2024-08-22 ENCOUNTER — OFFICE VISIT (OUTPATIENT)
Dept: ORTHOPEDIC SURGERY | Age: 64
End: 2024-08-22
Payer: MEDICARE

## 2024-08-22 VITALS — WEIGHT: 167 LBS | HEIGHT: 65 IN | RESPIRATION RATE: 16 BRPM | BODY MASS INDEX: 27.82 KG/M2

## 2024-08-22 DIAGNOSIS — M17.11 PRIMARY OSTEOARTHRITIS OF RIGHT KNEE: ICD-10-CM

## 2024-08-22 DIAGNOSIS — M17.12 PRIMARY OSTEOARTHRITIS OF LEFT KNEE: Primary | ICD-10-CM

## 2024-08-22 PROCEDURE — 99203 OFFICE O/P NEW LOW 30 MIN: CPT | Performed by: PHYSICIAN ASSISTANT

## 2024-08-22 PROCEDURE — 20610 DRAIN/INJ JOINT/BURSA W/O US: CPT | Performed by: PHYSICIAN ASSISTANT

## 2024-08-22 PROCEDURE — 1124F ACP DISCUSS-NO DSCNMKR DOCD: CPT | Performed by: PHYSICIAN ASSISTANT

## 2024-08-22 RX ORDER — LIDOCAINE HYDROCHLORIDE 10 MG/ML
1 INJECTION, SOLUTION INFILTRATION; PERINEURAL ONCE
Status: COMPLETED | OUTPATIENT
Start: 2024-08-22 | End: 2024-08-22

## 2024-08-22 RX ORDER — TRIAMCINOLONE ACETONIDE 40 MG/ML
40 INJECTION, SUSPENSION INTRA-ARTICULAR; INTRAMUSCULAR ONCE
Status: COMPLETED | OUTPATIENT
Start: 2024-08-22 | End: 2024-08-22

## 2024-08-22 RX ORDER — BUPIVACAINE HYDROCHLORIDE 2.5 MG/ML
1 INJECTION, SOLUTION INFILTRATION; PERINEURAL ONCE
Status: COMPLETED | OUTPATIENT
Start: 2024-08-22 | End: 2024-08-22

## 2024-08-22 RX ADMIN — BUPIVACAINE HYDROCHLORIDE 2.5 MG: 2.5 INJECTION, SOLUTION INFILTRATION; PERINEURAL at 13:40

## 2024-08-22 RX ADMIN — LIDOCAINE HYDROCHLORIDE 1 ML: 10 INJECTION, SOLUTION INFILTRATION; PERINEURAL at 13:41

## 2024-08-22 RX ADMIN — TRIAMCINOLONE ACETONIDE 40 MG: 40 INJECTION, SUSPENSION INTRA-ARTICULAR; INTRAMUSCULAR at 13:43

## 2024-08-22 RX ADMIN — LIDOCAINE HYDROCHLORIDE 1 ML: 10 INJECTION, SOLUTION INFILTRATION; PERINEURAL at 13:42

## 2024-08-22 NOTE — PROGRESS NOTES
is possible and may necessitate surgical treatment if it occurs in a joint or develops into an abscess.  Finally, a rise in blood sugar levels is anticipated, particularly in diabetics.  Diabetic patients were instructed to monitor their blood glucose levels after the injection and to adjust their insulin regimen as appropriate.  The patient elected to proceed, and after verbal consent was obtained and drug allergies were reviewed, the injection site was prepped with alcohol and ChloraPrep.  40mg of Kenalog mixed with lidocaine and marcaine (no epinephrine) was injected into bilateral knee(s).  There were no immediate complications after the procedure.  The patient was advised to ice the area intermittently over the next 24-48 hours until the corticosteroid becomes effective.    If pain/symptoms worsen and no longer respond to conservative treatment, we may order an MRI in the future to assess.      Elicia Somers PA-C  8/22/24  12:04 PM

## 2024-09-23 ENCOUNTER — OFFICE VISIT (OUTPATIENT)
Dept: DERMATOLOGY | Age: 64
End: 2024-09-23
Payer: MEDICARE

## 2024-09-23 DIAGNOSIS — D22.9 MULTIPLE NEVI: ICD-10-CM

## 2024-09-23 DIAGNOSIS — L57.0 AK (ACTINIC KERATOSIS): ICD-10-CM

## 2024-09-23 DIAGNOSIS — Z80.8 FAMILY HISTORY OF MALIGNANT MELANOMA: ICD-10-CM

## 2024-09-23 DIAGNOSIS — L81.4 LENTIGINES: ICD-10-CM

## 2024-09-23 DIAGNOSIS — L82.1 SEBORRHEIC KERATOSIS: ICD-10-CM

## 2024-09-23 DIAGNOSIS — Z85.828 HISTORY OF NONMELANOMA SKIN CANCER: Primary | ICD-10-CM

## 2024-09-23 PROCEDURE — 17003 DESTRUCT PREMALG LES 2-14: CPT | Performed by: DERMATOLOGY

## 2024-09-23 PROCEDURE — 17000 DESTRUCT PREMALG LESION: CPT | Performed by: DERMATOLOGY

## 2024-09-23 PROCEDURE — 1124F ACP DISCUSS-NO DSCNMKR DOCD: CPT | Performed by: DERMATOLOGY

## 2024-09-23 PROCEDURE — 99213 OFFICE O/P EST LOW 20 MIN: CPT | Performed by: DERMATOLOGY

## 2024-12-23 ENCOUNTER — OFFICE VISIT (OUTPATIENT)
Dept: FAMILY MEDICINE CLINIC | Age: 64
End: 2024-12-23

## 2024-12-23 VITALS
HEIGHT: 65 IN | HEART RATE: 85 BPM | RESPIRATION RATE: 18 BRPM | OXYGEN SATURATION: 97 % | WEIGHT: 174.2 LBS | SYSTOLIC BLOOD PRESSURE: 130 MMHG | BODY MASS INDEX: 29.02 KG/M2 | DIASTOLIC BLOOD PRESSURE: 80 MMHG

## 2024-12-23 DIAGNOSIS — Z00.00 WELCOME TO MEDICARE PREVENTIVE VISIT: Primary | ICD-10-CM

## 2024-12-23 DIAGNOSIS — Z13.1 SCREENING FOR DIABETES MELLITUS: ICD-10-CM

## 2024-12-23 DIAGNOSIS — Z13.220 SCREENING, LIPID: ICD-10-CM

## 2024-12-23 DIAGNOSIS — E66.3 OVERWEIGHT (BMI 25.0-29.9): ICD-10-CM

## 2024-12-23 DIAGNOSIS — M17.11 PRIMARY OSTEOARTHRITIS OF RIGHT KNEE: ICD-10-CM

## 2024-12-23 SDOH — ECONOMIC STABILITY: FOOD INSECURITY: WITHIN THE PAST 12 MONTHS, THE FOOD YOU BOUGHT JUST DIDN'T LAST AND YOU DIDN'T HAVE MONEY TO GET MORE.: NEVER TRUE

## 2024-12-23 SDOH — ECONOMIC STABILITY: INCOME INSECURITY: HOW HARD IS IT FOR YOU TO PAY FOR THE VERY BASICS LIKE FOOD, HOUSING, MEDICAL CARE, AND HEATING?: NOT HARD AT ALL

## 2024-12-23 SDOH — ECONOMIC STABILITY: FOOD INSECURITY: WITHIN THE PAST 12 MONTHS, YOU WORRIED THAT YOUR FOOD WOULD RUN OUT BEFORE YOU GOT MONEY TO BUY MORE.: NEVER TRUE

## 2024-12-23 ASSESSMENT — PATIENT HEALTH QUESTIONNAIRE - PHQ9
2. FEELING DOWN, DEPRESSED OR HOPELESS: NOT AT ALL
SUM OF ALL RESPONSES TO PHQ QUESTIONS 1-9: 0
1. LITTLE INTEREST OR PLEASURE IN DOING THINGS: NOT AT ALL
SUM OF ALL RESPONSES TO PHQ9 QUESTIONS 1 & 2: 0
SUM OF ALL RESPONSES TO PHQ QUESTIONS 1-9: 0

## 2024-12-23 ASSESSMENT — LIFESTYLE VARIABLES
HOW OFTEN DO YOU HAVE A DRINK CONTAINING ALCOHOL: 2-4 TIMES A MONTH
HOW MANY STANDARD DRINKS CONTAINING ALCOHOL DO YOU HAVE ON A TYPICAL DAY: 3 OR 4

## 2024-12-23 NOTE — ASSESSMENT & PLAN NOTE
- discussed that weight loss would likely help knee pain.    - declines other treatments.   - discussed exercises to help keep knees strong

## 2024-12-23 NOTE — PROGRESS NOTES
Medicare Annual Wellness Visit    Lesly Naranjo is here for Other (Would like to discuss weight loss medication like Zepbound./Having knee pain during strenuous exercise.)    Assessment & Plan   Assessment & Plan  Welcome to Medicare preventive visit   - Reviewed medical and social history together.  Discussed wide range of preventive recommendations for Medicare population, including fall prevention, exercise, recommendations for healthy produce-based diet, vaccines and routine screening tests, addressing all care gaps.    Vaccines recommended: prevnar 20 today.  COVID booster at her earliest convenience         Primary osteoarthritis of right knee   - discussed that weight loss would likely help knee pain.    - declines other treatments.   - discussed exercises to help keep knees strong         Overweight (BMI 25.0-29.9)   - discussed weight mgmt, general goals of health.  OK to start GLP-1 (no contraindications) if she is ok with continuing it long term to maintain weight loss.  She will consider our conversation and let me know later.         Screening, lipid      Orders:    Lipid Panel; Future    Screening for diabetes mellitus      Orders:    Comprehensive Metabolic Panel; Future    Recommendations for Preventive Services Due: see orders and patient instructions/AVS.  Recommended screening schedule for the next 5-10 years is provided to the patient in written form: see Patient Instructions/AVS.     Return in about 1 year (around 12/23/2025) for [facility] AWV.     Subjective   The following acute and/or chronic problems were also addressed today:    No ongoing med use;  Uses only MVI.    Occ uses omeprazole OTC for heart burn. Maybe 2x a month. Is not increasing infreq.  Triggered by junk food and alcohol.  Usually only special occasions.  No dysphagia.    Date of last Colonoscopy: 9/11/2018 (10 yr recall)    Date of last Mammogram: 8/14/2024     Due for pneumovax now that she is 63.    Lab Results   Component

## 2024-12-27 DIAGNOSIS — Z13.1 SCREENING FOR DIABETES MELLITUS: ICD-10-CM

## 2024-12-27 DIAGNOSIS — Z13.220 SCREENING, LIPID: ICD-10-CM

## 2024-12-27 LAB
ALBUMIN SERPL-MCNC: 4.2 G/DL (ref 3.4–5)
ALBUMIN/GLOB SERPL: 1.5 {RATIO} (ref 1.1–2.2)
ALP SERPL-CCNC: 118 U/L (ref 40–129)
ALT SERPL-CCNC: 24 U/L (ref 10–40)
ANION GAP SERPL CALCULATED.3IONS-SCNC: 9 MMOL/L (ref 3–16)
AST SERPL-CCNC: 23 U/L (ref 15–37)
BILIRUB SERPL-MCNC: 0.4 MG/DL (ref 0–1)
BUN SERPL-MCNC: 16 MG/DL (ref 7–20)
CALCIUM SERPL-MCNC: 9.7 MG/DL (ref 8.3–10.6)
CHLORIDE SERPL-SCNC: 105 MMOL/L (ref 99–110)
CHOLEST SERPL-MCNC: 250 MG/DL (ref 0–199)
CO2 SERPL-SCNC: 28 MMOL/L (ref 21–32)
CREAT SERPL-MCNC: 0.7 MG/DL (ref 0.6–1.2)
GFR SERPLBLD CREATININE-BSD FMLA CKD-EPI: >90 ML/MIN/{1.73_M2}
GLUCOSE SERPL-MCNC: 83 MG/DL (ref 70–99)
HDLC SERPL-MCNC: 81 MG/DL (ref 40–60)
LDLC SERPL CALC-MCNC: 146 MG/DL
POTASSIUM SERPL-SCNC: 4.2 MMOL/L (ref 3.5–5.1)
PROT SERPL-MCNC: 7 G/DL (ref 6.4–8.2)
SODIUM SERPL-SCNC: 142 MMOL/L (ref 136–145)
TRIGL SERPL-MCNC: 113 MG/DL (ref 0–150)
VLDLC SERPL CALC-MCNC: 23 MG/DL

## 2024-12-30 ENCOUNTER — TELEPHONE (OUTPATIENT)
Dept: FAMILY MEDICINE CLINIC | Age: 64
End: 2024-12-30

## 2024-12-30 NOTE — TELEPHONE ENCOUNTER
Patient is calling to talk to Dr. Gentile about OZEMPIC    At her last visit they were talking about it and he told her to call back after she thought it over    Please Advise

## 2024-12-31 NOTE — TELEPHONE ENCOUNTER
Talked to Lesly.  Wants to try GLP-1 therapy. Understands pros/cons.    Start 0.25 mg semaglutide, to [facility].  Incr 0.5, then 1 mg weekly month by month.    She will call when ready for a dose increase.    Follow up for re-eval and response after at least 1 month at the 1 mg dose.

## 2025-01-22 ENCOUNTER — TELEPHONE (OUTPATIENT)
Dept: FAMILY MEDICINE CLINIC | Age: 65
End: 2025-01-22

## 2025-01-22 NOTE — TELEPHONE ENCOUNTER
Pt called in, she was given a GPL1 and would like to speak with Dr. Gentile before med is refilled. She is leaving on a cruise 1/31 and is not sure if medication can be refilled to [facility] before she leaves.   Pt stated that the medication has not done anything for her. Would like to know if it needs adjusted.    Pt would like to speak with Dr. Gentile at [phone]

## 2025-01-22 NOTE — TELEPHONE ENCOUNTER
Patient calls in to request an increase in dosage on her Semaglutide.  She states she has not had any side effects nor has she had any weight loss.  Med is pended.

## 2025-01-22 NOTE — TELEPHONE ENCOUNTER
Please notify Lesly Naranjo that she can refill the semaglutide whenever she wishes- it is not a controlled medicine and she is not limited by her health insurance.  I ordered the 0.5 mg dose to [facility] (the next higher dose).   She can increase to 1 mg after another month if she wishes.    Have her plan to follow up on this in March or April.    Thanks.

## 2025-02-20 ENCOUNTER — TELEPHONE (OUTPATIENT)
Dept: FAMILY MEDICINE CLINIC | Age: 65
End: 2025-02-20

## 2025-02-20 NOTE — TELEPHONE ENCOUNTER
Lesly is on semaglutide (Wegovy).  Will order the next higher dose to [facility] and then ask lesly to follow up on this about 3 months after starting it (which I think was 1/1/25)    So her next dose is 1 mg.  If she wants to increase to 1.7 mg for ner next refill, that's fine. I just need to hear from her.    Plan f/u in April.  Thanks.

## 2025-02-20 NOTE — TELEPHONE ENCOUNTER
Pt called in needs a refill on zepbound   Pt stated she is not sure if Dr ELAINE wants the dose increased she said he seems to be gradually increasing the dose     Verified pharmacy

## 2025-02-24 NOTE — TELEPHONE ENCOUNTER
Pt calling in to check status.     Advised pt of message    She would like to do 1mg as last dose was .5, she is on a compound of generic zepbound not on wegovy.     Pt is needing script sent today, she is due for med 2/27 and [facility] will need script 48 hours in advance to fill    Please advise  Pt would like a call at [phone]

## 2025-02-24 NOTE — TELEPHONE ENCOUNTER
I have only prescribed semaglutide (generic for Wegovy) for Lesly, not tirzepatide (Zepbound).    I sent the 1 mg dose to  [facility] on the 20th.    Please let her now.  Thanks.

## 2025-03-24 ENCOUNTER — OFFICE VISIT (OUTPATIENT)
Dept: FAMILY MEDICINE CLINIC | Age: 65
End: 2025-03-24
Payer: MEDICARE

## 2025-03-24 VITALS
WEIGHT: 160.4 LBS | HEART RATE: 75 BPM | SYSTOLIC BLOOD PRESSURE: 124 MMHG | DIASTOLIC BLOOD PRESSURE: 80 MMHG | BODY MASS INDEX: 26.73 KG/M2 | RESPIRATION RATE: 16 BRPM | HEIGHT: 65 IN | OXYGEN SATURATION: 95 %

## 2025-03-24 DIAGNOSIS — E66.3 OVERWEIGHT: Primary | ICD-10-CM

## 2025-03-24 PROCEDURE — 1159F MED LIST DOCD IN RCRD: CPT | Performed by: FAMILY MEDICINE

## 2025-03-24 PROCEDURE — 99213 OFFICE O/P EST LOW 20 MIN: CPT | Performed by: FAMILY MEDICINE

## 2025-03-24 PROCEDURE — G2211 COMPLEX E/M VISIT ADD ON: HCPCS | Performed by: FAMILY MEDICINE

## 2025-03-24 PROCEDURE — 1124F ACP DISCUSS-NO DSCNMKR DOCD: CPT | Performed by: FAMILY MEDICINE

## 2025-03-24 PROCEDURE — 1160F RVW MEDS BY RX/DR IN RCRD: CPT | Performed by: FAMILY MEDICINE

## 2025-03-24 ASSESSMENT — PATIENT HEALTH QUESTIONNAIRE - PHQ9
1. LITTLE INTEREST OR PLEASURE IN DOING THINGS: NOT AT ALL
SUM OF ALL RESPONSES TO PHQ QUESTIONS 1-9: 0
2. FEELING DOWN, DEPRESSED OR HOPELESS: NOT AT ALL
SUM OF ALL RESPONSES TO PHQ QUESTIONS 1-9: 0
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
## 2025-04-11 ENCOUNTER — TELEPHONE (OUTPATIENT)
Dept: FAMILY MEDICINE CLINIC | Age: 65
End: 2025-04-11

## 2025-04-11 NOTE — TELEPHONE ENCOUNTER
Pt stated that she is taking med and would like to go to the next increased dose. Has been on the same dose x2 months.    Pharm confirmed - [facility]    PT WOULD LIKE CALL BACK when sent in [phone]

## 2025-08-15 ENCOUNTER — HOSPITAL ENCOUNTER (OUTPATIENT)
Dept: WOMENS IMAGING | Age: 65
Discharge: HOME | End: 2025-08-15
Payer: MEDICARE

## 2025-08-15 VITALS — BODY MASS INDEX: 24.99 KG/M2 | HEIGHT: 65 IN | WEIGHT: 150 LBS

## 2025-08-15 DIAGNOSIS — Z12.31 BREAST CANCER SCREENING BY MAMMOGRAM: ICD-10-CM

## 2025-08-15 PROCEDURE — 77063 BREAST TOMOSYNTHESIS BI: CPT

## (undated) DEVICE — SOLUTION IV IRRIG POUR BRL 0.9% SODIUM CHL 2F7124

## (undated) DEVICE — HOOK RETRCT L5MM E SHRP SELF RET SYS LONE STAR

## (undated) DEVICE — 3M™ STERI-STRIP™ COMPOUND BENZOIN TINCTURE 40 BAGS/CARTON 4 CARTONS/CASE C1544: Brand: 3M™ STERI-STRIP™

## (undated) DEVICE — BASIC SINGLE BASIN 1-LF: Brand: MEDLINE INDUSTRIES, INC.

## (undated) DEVICE — SOLUTION IRRIG 1000ML 0.9% SOD CHL USP POUR PLAS BTL

## (undated) DEVICE — GOWN SIRUS NONREIN XL W/TWL: Brand: MEDLINE INDUSTRIES, INC.

## (undated) DEVICE — GAUZE,PACKING STRIP,IODOFORM,2"X5YD,STRL: Brand: CURAD

## (undated) DEVICE — SOLUTION IRRIGATION STRL H2O 1000 ML UROMATIC CONTAINER

## (undated) DEVICE — CANISTER, RIGID, 1200CC: Brand: MEDLINE INDUSTRIES, INC.

## (undated) DEVICE — NEEDLE HYPO 25GA L1.5IN BVL ORIENTED ECLIPSE

## (undated) DEVICE — SUTURE PROL SZ 2-0 L30IN NONABSORBABLE BLU L26MM SH 1/2 CIR 8833H

## (undated) DEVICE — 3M™ TEGADERM™ TRANSPARENT FILM DRESSING FRAME STYLE, 1626W, 4 IN X 4-3/4 IN (10 CM X 12 CM), 50/CT 4CT/CASE: Brand: 3M™ TEGADERM™

## (undated) DEVICE — MINOR SET UP: Brand: MEDLINE INDUSTRIES, INC.

## (undated) DEVICE — SOLUTION PREP POVIDONE IOD FOR SKIN MUCOUS MEM PRIOR TO

## (undated) DEVICE — ADHESIVE SKIN CLOSURE TOP 36 CC HI VISC DERMBND MINI

## (undated) DEVICE — ELECTRODE PT RET AD L9FT HI MOIST COND ADH HYDRGEL CORDED

## (undated) DEVICE — SYRINGE 20ML LL S/C 50

## (undated) DEVICE — GLOVE ORANGE PI 7   MSG9070

## (undated) DEVICE — SOLUTION PREP PAINT POV IOD FOR SKIN MUCOUS MEM

## (undated) DEVICE — CATHETER URETH 18FR 2CC BLLN SIL ELASTMR F 2 W BARDX

## (undated) DEVICE — SOLUTION IRRIG 3000ML STRL H2O USP UROMATIC PLAS CONT

## (undated) DEVICE — CYSTO/BLADDER IRRIGATION SET, REGULATING CLAMP

## (undated) DEVICE — TUBING, SUCTION, 1/4" X 12', STRAIGHT: Brand: MEDLINE

## (undated) DEVICE — SOLUTION SCRB 4OZ 7.5% POVIDONE IOD ANTIMIC BTL

## (undated) DEVICE — SUTURE VCRL + SZ 3-0 L27IN ABSRB UD L26MM SH 1/2 CIR VCP416H

## (undated) DEVICE — GLOVE ORANGE PI 7 1/2   MSG9075

## (undated) DEVICE — MAJOR SET UP: Brand: MEDLINE INDUSTRIES, INC.

## (undated) DEVICE — BAG DRAINAGE 2 LT STRL ANRFLX LF

## (undated) DEVICE — STRIP,CLOSURE,WOUND,MEDI-STRIP,1/4X3: Brand: MEDLINE

## (undated) DEVICE — MINOR SET UP PK

## (undated) DEVICE — DRAPE,MINOR PROC,6X6 FEN, STER: Brand: MEDLINE

## (undated) DEVICE — 1016 S-DRAPE IRRIG POUCH 10/BOX: Brand: STERI-DRAPE™

## (undated) DEVICE — SOLUTION SCRB 4OZ 10% POVIDONE IOD ANTIMIC BTL

## (undated) DEVICE — MERCY HEALTH WEST TURNOVER: Brand: MEDLINE INDUSTRIES, INC.

## (undated) DEVICE — 1010 S-DRAPE TOWEL DRAPE 10/BX: Brand: STERI-DRAPE™

## (undated) DEVICE — DRAPE LAP 104X35X124IN BLU CTRL + FAB REINF ABD FEN

## (undated) DEVICE — SKIN MARKER REGULAR TIP WITH RULER CAP AND LABELS: Brand: DEVON

## (undated) DEVICE — DRAINBAG,ANTI-REFLUX TOWER,L/F,2000ML,LL: Brand: MEDLINE

## (undated) DEVICE — GOWN SIRUS NONREIN LG W/TWL: Brand: MEDLINE INDUSTRIES, INC.

## (undated) DEVICE — SUTURE VCRL + SZ 4-0 L27IN ABSRB WHT FS-2 3/8 CIR REV CUT VCP422H

## (undated) DEVICE — SYRINGE MED 10ML LUERLOCK TIP W/O SFTY DISP

## (undated) DEVICE — NEEDLE HYPO 23GA L1.5IN TURQ POLYPR HUB S STL THN WALL IM

## (undated) DEVICE — NEEDLE SPNL 22GA L3.5IN BLK HUB S STL REG WALL FIT STYL W/

## (undated) DEVICE — SPONGE GZ W4XL4IN COT 12 PLY TYP VII WVN C FLD DSGN

## (undated) DEVICE — PENROSE DRAIN 18 X .5" SILICONE: Brand: MEDLINE

## (undated) DEVICE — SUTURE VCRL + SZ 4-0 L27IN ABSRB UD PS-2 3/8 CIR REV CUT VCP426H

## (undated) DEVICE — STRL PENROSE DRAIN 18" X 1/2": Brand: CARDINAL HEALTH

## (undated) DEVICE — SUTURE VCRL SZ 2-0 L27IN ABSRB UD L26MM SH 1/2 CIR J417H

## (undated) DEVICE — SPONGE GZ W4XL4IN COT 12 PLY TYP VII WVN C FLD DSGN STERILE

## (undated) DEVICE — LIQUIBAND RAPID ADHESIVE 36/CS 0.8ML: Brand: MEDLINE

## (undated) DEVICE — Z DISCONTINUED BY MEDLINE USE 2711682 TRAY SKIN PREP DRY W/ PREM GLV

## (undated) DEVICE — SYRINGE IRRIG 60ML SFT PLIABLE BLB EZ TO GRP 1 HND USE W/

## (undated) DEVICE — DRAPE,UNDERBUTTOCKS,PCH,STERILE: Brand: MEDLINE

## (undated) DEVICE — CATHETER F BLLN 5CC 16FR 2 W HYDRGEL COAT LESS TRAUM LUB

## (undated) DEVICE — STERILE SURGICAL LUBRICANT, METAL TUBE: Brand: SURGILUBE

## (undated) DEVICE — PREMIUM DRY TRAY LF: Brand: MEDLINE INDUSTRIES, INC.

## (undated) DEVICE — STERILE LATEX POWDER-FREE SURGICAL GLOVESWITH NITRILE AND EMOLLIENT COATINGS: Brand: PROTEXIS